# Patient Record
Sex: FEMALE | Race: WHITE | Employment: OTHER | URBAN - METROPOLITAN AREA
[De-identification: names, ages, dates, MRNs, and addresses within clinical notes are randomized per-mention and may not be internally consistent; named-entity substitution may affect disease eponyms.]

---

## 2017-10-26 ENCOUNTER — HOSPITAL ENCOUNTER (OUTPATIENT)
Dept: MAMMOGRAPHY | Age: 72
Discharge: HOME | End: 2017-10-26
Attending: SURGERY | Admitting: SURGERY

## 2017-10-26 DIAGNOSIS — R92.8 ABNORMAL MAMMOGRAM: ICD-10-CM

## 2018-04-04 VITALS
OXYGEN SATURATION: 100 % | WEIGHT: 210 LBS | DIASTOLIC BLOOD PRESSURE: 53 MMHG | BODY MASS INDEX: 34.99 KG/M2 | TEMPERATURE: 97 F | HEIGHT: 65 IN | RESPIRATION RATE: 16 BRPM | SYSTOLIC BLOOD PRESSURE: 136 MMHG | HEART RATE: 76 BPM

## 2018-04-04 RX ORDER — OYSTER SHELL CALCIUM WITH VITAMIN D 500; 200 MG/1; [IU]/1
1 TABLET, FILM COATED ORAL DAILY
COMMUNITY

## 2018-05-02 ENCOUNTER — HOSPITAL ENCOUNTER (OUTPATIENT)
Dept: ENDOSCOPY | Age: 73
Discharge: HOME | End: 2018-04-04

## 2018-10-25 ENCOUNTER — HOSPITAL ENCOUNTER (OUTPATIENT)
Dept: MAMMOGRAPHY | Age: 73
Discharge: HOME | End: 2018-10-25
Payer: MEDICARE

## 2018-10-25 DIAGNOSIS — Z12.31 VISIT FOR SCREENING MAMMOGRAM: ICD-10-CM

## 2018-10-25 PROCEDURE — 77063 BREAST TOMOSYNTHESIS BI: CPT

## 2019-02-25 ENCOUNTER — HOSPITAL ENCOUNTER (OUTPATIENT)
Age: 74
Setting detail: OUTPATIENT SURGERY
Discharge: HOME | End: 2019-02-25
Attending: INTERNAL MEDICINE | Admitting: INTERNAL MEDICINE
Payer: MEDICARE

## 2019-02-25 VITALS
HEART RATE: 59 BPM | WEIGHT: 215 LBS | DIASTOLIC BLOOD PRESSURE: 51 MMHG | TEMPERATURE: 97.2 F | OXYGEN SATURATION: 96 % | HEIGHT: 65 IN | SYSTOLIC BLOOD PRESSURE: 131 MMHG | RESPIRATION RATE: 16 BRPM | BODY MASS INDEX: 35.82 KG/M2

## 2019-02-25 PROCEDURE — 6370000000 HC RX 637 (ALT 250 FOR IP): Performed by: INTERNAL MEDICINE

## 2019-02-25 PROCEDURE — 88305 TISSUE EXAM BY PATHOLOGIST: CPT

## 2019-02-25 PROCEDURE — 7100000010 HC PHASE II RECOVERY - FIRST 15 MIN: Performed by: INTERNAL MEDICINE

## 2019-02-25 PROCEDURE — 7100000011 HC PHASE II RECOVERY - ADDTL 15 MIN: Performed by: INTERNAL MEDICINE

## 2019-02-25 PROCEDURE — 3609012400 HC EGD TRANSORAL BIOPSY SINGLE/MULTIPLE: Performed by: INTERNAL MEDICINE

## 2019-02-25 PROCEDURE — 2709999900 HC NON-CHARGEABLE SUPPLY: Performed by: INTERNAL MEDICINE

## 2019-02-25 PROCEDURE — 6360000002 HC RX W HCPCS: Performed by: INTERNAL MEDICINE

## 2019-02-25 PROCEDURE — 99152 MOD SED SAME PHYS/QHP 5/>YRS: CPT | Performed by: INTERNAL MEDICINE

## 2019-02-25 RX ORDER — SERTRALINE HYDROCHLORIDE 25 MG/1
25 TABLET, FILM COATED ORAL
COMMUNITY
Start: 2019-02-20

## 2019-02-25 RX ORDER — MIRABEGRON 25 MG/1
TABLET, FILM COATED, EXTENDED RELEASE ORAL
COMMUNITY
Start: 2019-02-20

## 2019-02-25 RX ORDER — METOPROLOL SUCCINATE 25 MG/1
12.5 TABLET, EXTENDED RELEASE ORAL DAILY
COMMUNITY

## 2019-02-25 RX ORDER — MIDAZOLAM HYDROCHLORIDE 1 MG/ML
INJECTION INTRAMUSCULAR; INTRAVENOUS PRN
Status: DISCONTINUED | OUTPATIENT
Start: 2019-02-25 | End: 2019-02-25 | Stop reason: ALTCHOICE

## 2019-02-25 RX ORDER — BUMETANIDE 0.5 MG/1
0.5 TABLET ORAL DAILY
COMMUNITY
End: 2022-03-23 | Stop reason: SDUPTHER

## 2019-02-25 RX ORDER — MEPERIDINE HYDROCHLORIDE 50 MG/ML
INJECTION INTRAMUSCULAR; INTRAVENOUS; SUBCUTANEOUS PRN
Status: DISCONTINUED | OUTPATIENT
Start: 2019-02-25 | End: 2019-02-25 | Stop reason: ALTCHOICE

## 2019-02-25 RX ORDER — ISOSORBIDE MONONITRATE 30 MG/1
30 TABLET, EXTENDED RELEASE ORAL DAILY
COMMUNITY

## 2019-02-25 ASSESSMENT — PAIN - FUNCTIONAL ASSESSMENT
PAIN_FUNCTIONAL_ASSESSMENT: 0-10
PAIN_FUNCTIONAL_ASSESSMENT: 0-10
PAIN_FUNCTIONAL_ASSESSMENT: FACES

## 2019-02-25 ASSESSMENT — PAIN SCALES - GENERAL
PAINLEVEL_OUTOF10: 0
PAINLEVEL_OUTOF10: 0

## 2019-07-31 ENCOUNTER — HOSPITAL ENCOUNTER (OUTPATIENT)
Age: 74
Setting detail: SPECIMEN
Discharge: HOME | End: 2019-07-31
Payer: MEDICARE

## 2019-07-31 PROCEDURE — 88341 IMHCHEM/IMCYTCHM EA ADD ANTB: CPT

## 2019-07-31 PROCEDURE — 88342 IMHCHEM/IMCYTCHM 1ST ANTB: CPT

## 2019-10-31 ENCOUNTER — HOSPITAL ENCOUNTER (OUTPATIENT)
Dept: MAMMOGRAPHY | Age: 74
Discharge: HOME | End: 2019-10-31
Payer: MEDICARE

## 2019-10-31 DIAGNOSIS — Z12.31 VISIT FOR SCREENING MAMMOGRAM: ICD-10-CM

## 2019-10-31 PROCEDURE — 77063 BREAST TOMOSYNTHESIS BI: CPT

## 2020-04-01 LAB
CANDIDA SPECIES, DNA PROBE: NORMAL
GARDNERELLA VAGINALIS, DNA PROBE: NORMAL
TRICHOMONAS VAGINALIS DNA: NORMAL

## 2020-04-02 LAB
ORGANISM: ABNORMAL
URINE CULTURE, ROUTINE: ABNORMAL

## 2020-11-12 ENCOUNTER — HOSPITAL ENCOUNTER (OUTPATIENT)
Dept: MAMMOGRAPHY | Age: 75
Discharge: HOME | End: 2020-11-12
Payer: MEDICARE

## 2020-11-12 PROCEDURE — 77063 BREAST TOMOSYNTHESIS BI: CPT

## 2021-09-20 ENCOUNTER — APPOINTMENT (RX ONLY)
Dept: URBAN - METROPOLITAN AREA CLINIC 170 | Facility: CLINIC | Age: 76
Setting detail: DERMATOLOGY
End: 2021-09-20

## 2021-09-20 DIAGNOSIS — L81.4 OTHER MELANIN HYPERPIGMENTATION: ICD-10-CM

## 2021-09-20 DIAGNOSIS — L82.1 OTHER SEBORRHEIC KERATOSIS: ICD-10-CM

## 2021-09-20 DIAGNOSIS — D22 MELANOCYTIC NEVI: ICD-10-CM

## 2021-09-20 DIAGNOSIS — Z85.828 PERSONAL HISTORY OF OTHER MALIGNANT NEOPLASM OF SKIN: ICD-10-CM

## 2021-09-20 DIAGNOSIS — D18.0 HEMANGIOMA: ICD-10-CM

## 2021-09-20 PROBLEM — D18.01 HEMANGIOMA OF SKIN AND SUBCUTANEOUS TISSUE: Status: ACTIVE | Noted: 2021-09-20

## 2021-09-20 PROBLEM — D22.5 MELANOCYTIC NEVI OF TRUNK: Status: ACTIVE | Noted: 2021-09-20

## 2021-09-20 PROCEDURE — ? COUNSELING

## 2021-09-20 PROCEDURE — ? ADDITIONAL NOTES

## 2021-09-20 PROCEDURE — ? TREATMENT REGIMEN

## 2021-09-20 PROCEDURE — ? FULL BODY SKIN EXAM

## 2021-09-20 PROCEDURE — 99203 OFFICE O/P NEW LOW 30 MIN: CPT

## 2021-09-20 ASSESSMENT — LOCATION ZONE DERM: LOCATION ZONE: TRUNK

## 2021-09-20 ASSESSMENT — LOCATION SIMPLE DESCRIPTION DERM
LOCATION SIMPLE: CHEST
LOCATION SIMPLE: LEFT BREAST

## 2021-09-20 ASSESSMENT — LOCATION DETAILED DESCRIPTION DERM
LOCATION DETAILED: LEFT MEDIAL BREAST 10-11:00 REGION
LOCATION DETAILED: MIDDLE STERNUM
LOCATION DETAILED: STERNAL NOTCH
LOCATION DETAILED: UPPER STERNUM

## 2021-09-20 NOTE — PROCEDURE: MIPS QUALITY
Quality 226: Preventive Care And Screening: Tobacco Use: Screening And Cessation Intervention: Patient screened for tobacco use and is an ex/non-smoker
Quality 431: Preventive Care And Screening: Unhealthy Alcohol Use - Screening: Documentation of medical reason(s) for not screening for unhealthy alcohol use (e.g., limited life expectancy, other medical reasons)
Quality 130: Documentation Of Current Medications In The Medical Record: Current Medications with Name, Dosage, Frequency, or Route not Documented, Reason not Given
Detail Level: Detailed

## 2021-09-20 NOTE — HPI: EVALUATION OF SKIN LESION(S)
What Type Of Note Output Would You Prefer (Optional)?: Bullet Format
Hpi Title: Evaluation of Skin Lesions
How Severe Are Your Spot(S)?: mild
Have Your Spot(S) Been Treated In The Past?: has not been treated
Additional History: Patient believes she had a bcc or scc  by another derm

## 2021-12-09 ENCOUNTER — HOSPITAL ENCOUNTER (OUTPATIENT)
Dept: MAMMOGRAPHY | Age: 76
Discharge: HOME | End: 2021-12-09
Payer: MEDICARE

## 2021-12-09 VITALS — HEIGHT: 65 IN | WEIGHT: 210 LBS | BODY MASS INDEX: 34.99 KG/M2

## 2021-12-09 DIAGNOSIS — Z12.31 VISIT FOR SCREENING MAMMOGRAM: ICD-10-CM

## 2021-12-09 PROCEDURE — 77063 BREAST TOMOSYNTHESIS BI: CPT

## 2022-02-21 ENCOUNTER — HOSPITAL ENCOUNTER (EMERGENCY)
Age: 77
Discharge: HOME | End: 2022-02-22
Attending: EMERGENCY MEDICINE
Payer: MEDICARE

## 2022-02-21 ENCOUNTER — APPOINTMENT (OUTPATIENT)
Dept: CT IMAGING | Age: 77
End: 2022-02-21
Payer: MEDICARE

## 2022-02-21 ENCOUNTER — APPOINTMENT (OUTPATIENT)
Dept: GENERAL RADIOLOGY | Age: 77
End: 2022-02-21
Payer: MEDICARE

## 2022-02-21 DIAGNOSIS — R07.9 CHEST PAIN, UNSPECIFIED TYPE: Primary | ICD-10-CM

## 2022-02-21 LAB
ANION GAP SERPL CALCULATED.3IONS-SCNC: 13 MMOL/L (ref 3–16)
BASOPHILS ABSOLUTE: 0 K/UL (ref 0–0.2)
BASOPHILS RELATIVE PERCENT: 0.5 %
BILIRUBIN URINE: NEGATIVE
BLOOD, URINE: NEGATIVE
BUN BLDV-MCNC: 19 MG/DL (ref 7–20)
CALCIUM SERPL-MCNC: 9.1 MG/DL (ref 8.3–10.6)
CHLORIDE BLD-SCNC: 102 MMOL/L (ref 99–110)
CLARITY: CLEAR
CO2: 22 MMOL/L (ref 21–32)
COLOR: YELLOW
CREAT SERPL-MCNC: 1.1 MG/DL (ref 0.6–1.2)
D DIMER: 289 NG/ML DDU (ref 0–229)
EOSINOPHILS ABSOLUTE: 0.2 K/UL (ref 0–0.6)
EOSINOPHILS RELATIVE PERCENT: 2.1 %
EPITHELIAL CELLS, UA: ABNORMAL /HPF (ref 0–5)
GFR AFRICAN AMERICAN: 59
GFR NON-AFRICAN AMERICAN: 48
GLUCOSE BLD-MCNC: 190 MG/DL (ref 70–99)
GLUCOSE URINE: NEGATIVE MG/DL
HCT VFR BLD CALC: 34.6 % (ref 36–48)
HEMOGLOBIN: 12.3 G/DL (ref 12–16)
HYALINE CASTS: ABNORMAL /LPF (ref 0–2)
KETONES, URINE: NEGATIVE MG/DL
LEUKOCYTE ESTERASE, URINE: NEGATIVE
LYMPHOCYTES ABSOLUTE: 2.5 K/UL (ref 1–5.1)
LYMPHOCYTES RELATIVE PERCENT: 30.8 %
MCH RBC QN AUTO: 30.6 PG (ref 26–34)
MCHC RBC AUTO-ENTMCNC: 35.5 G/DL (ref 31–36)
MCV RBC AUTO: 86 FL (ref 80–100)
MICROSCOPIC EXAMINATION: ABNORMAL
MONOCYTES ABSOLUTE: 0.6 K/UL (ref 0–1.3)
MONOCYTES RELATIVE PERCENT: 7.5 %
NEUTROPHILS ABSOLUTE: 4.7 K/UL (ref 1.7–7.7)
NEUTROPHILS RELATIVE PERCENT: 59.1 %
NITRITE, URINE: NEGATIVE
PDW BLD-RTO: 14.5 % (ref 12.4–15.4)
PH UA: 6 (ref 5–8)
PLATELET # BLD: 143 K/UL (ref 135–450)
PMV BLD AUTO: 8.6 FL (ref 5–10.5)
POTASSIUM REFLEX MAGNESIUM: 4 MMOL/L (ref 3.5–5.1)
PRO-BNP: 127 PG/ML (ref 0–449)
PROTEIN UA: NEGATIVE MG/DL
RBC # BLD: 4.02 M/UL (ref 4–5.2)
RBC UA: ABNORMAL /HPF (ref 0–4)
SODIUM BLD-SCNC: 137 MMOL/L (ref 136–145)
SPECIFIC GRAVITY UA: 1.02 (ref 1–1.03)
TROPONIN: <0.01 NG/ML
URINE TYPE: ABNORMAL
UROBILINOGEN, URINE: 0.2 E.U./DL
WBC # BLD: 8 K/UL (ref 4–11)
WBC UA: ABNORMAL /HPF (ref 0–5)

## 2022-02-21 PROCEDURE — 83880 ASSAY OF NATRIURETIC PEPTIDE: CPT

## 2022-02-21 PROCEDURE — 6360000004 HC RX CONTRAST MEDICATION: Performed by: EMERGENCY MEDICINE

## 2022-02-21 PROCEDURE — 84484 ASSAY OF TROPONIN QUANT: CPT

## 2022-02-21 PROCEDURE — 85025 COMPLETE CBC W/AUTO DIFF WBC: CPT

## 2022-02-21 PROCEDURE — 93005 ELECTROCARDIOGRAM TRACING: CPT | Performed by: EMERGENCY MEDICINE

## 2022-02-21 PROCEDURE — 36415 COLL VENOUS BLD VENIPUNCTURE: CPT

## 2022-02-21 PROCEDURE — 80048 BASIC METABOLIC PNL TOTAL CA: CPT

## 2022-02-21 PROCEDURE — 71046 X-RAY EXAM CHEST 2 VIEWS: CPT

## 2022-02-21 PROCEDURE — 71260 CT THORAX DX C+: CPT

## 2022-02-21 PROCEDURE — 81001 URINALYSIS AUTO W/SCOPE: CPT

## 2022-02-21 PROCEDURE — 85379 FIBRIN DEGRADATION QUANT: CPT

## 2022-02-21 PROCEDURE — 99284 EMERGENCY DEPT VISIT MOD MDM: CPT

## 2022-02-21 RX ORDER — SODIUM CHLORIDE 9 MG/ML
25 INJECTION, SOLUTION INTRAVENOUS PRN
Status: DISCONTINUED | OUTPATIENT
Start: 2022-02-21 | End: 2022-02-22 | Stop reason: HOSPADM

## 2022-02-21 RX ORDER — DOBUTAMINE HYDROCHLORIDE 200 MG/100ML
10 INJECTION INTRAVENOUS CONTINUOUS
Status: DISCONTINUED | OUTPATIENT
Start: 2022-02-21 | End: 2022-02-22 | Stop reason: HOSPADM

## 2022-02-21 RX ORDER — SODIUM CHLORIDE 0.9 % (FLUSH) 0.9 %
5-40 SYRINGE (ML) INJECTION EVERY 12 HOURS SCHEDULED
Status: DISCONTINUED | OUTPATIENT
Start: 2022-02-21 | End: 2022-02-22 | Stop reason: HOSPADM

## 2022-02-21 RX ORDER — ASPIRIN 81 MG/1
81 TABLET, CHEWABLE ORAL DAILY
Status: DISCONTINUED | OUTPATIENT
Start: 2022-02-22 | End: 2022-02-22 | Stop reason: HOSPADM

## 2022-02-21 RX ORDER — SODIUM CHLORIDE 0.9 % (FLUSH) 0.9 %
5-40 SYRINGE (ML) INJECTION PRN
Status: DISCONTINUED | OUTPATIENT
Start: 2022-02-21 | End: 2022-02-22 | Stop reason: HOSPADM

## 2022-02-21 RX ADMIN — IOPAMIDOL 80 ML: 755 INJECTION, SOLUTION INTRAVENOUS at 21:44

## 2022-02-21 ASSESSMENT — PAIN DESCRIPTION - FREQUENCY: FREQUENCY: CONTINUOUS

## 2022-02-21 ASSESSMENT — PAIN DESCRIPTION - PAIN TYPE: TYPE: ACUTE PAIN

## 2022-02-21 ASSESSMENT — PAIN DESCRIPTION - ORIENTATION: ORIENTATION: MID

## 2022-02-21 ASSESSMENT — PAIN DESCRIPTION - ONSET: ONSET: ON-GOING

## 2022-02-21 ASSESSMENT — PAIN - FUNCTIONAL ASSESSMENT: PAIN_FUNCTIONAL_ASSESSMENT: 0-10

## 2022-02-21 ASSESSMENT — PAIN SCALES - GENERAL: PAINLEVEL_OUTOF10: 7

## 2022-02-21 ASSESSMENT — PAIN DESCRIPTION - LOCATION: LOCATION: CHEST

## 2022-02-21 ASSESSMENT — PAIN DESCRIPTION - DESCRIPTORS: DESCRIPTORS: ACHING

## 2022-02-22 ENCOUNTER — APPOINTMENT (OUTPATIENT)
Dept: VASCULAR LAB | Age: 77
End: 2022-02-22
Payer: MEDICARE

## 2022-02-22 VITALS
WEIGHT: 220 LBS | HEART RATE: 74 BPM | RESPIRATION RATE: 8 BRPM | TEMPERATURE: 97.7 F | BODY MASS INDEX: 36.65 KG/M2 | OXYGEN SATURATION: 92 % | DIASTOLIC BLOOD PRESSURE: 55 MMHG | HEIGHT: 65 IN | SYSTOLIC BLOOD PRESSURE: 112 MMHG

## 2022-02-22 LAB
EKG ATRIAL RATE: 89 BPM
EKG DIAGNOSIS: NORMAL
EKG P AXIS: 48 DEGREES
EKG P-R INTERVAL: 166 MS
EKG Q-T INTERVAL: 360 MS
EKG QRS DURATION: 96 MS
EKG QTC CALCULATION (BAZETT): 438 MS
EKG R AXIS: 17 DEGREES
EKG T AXIS: 47 DEGREES
EKG VENTRICULAR RATE: 89 BPM
LV EF: 72 %
LVEF MODALITY: NORMAL
TROPONIN: <0.01 NG/ML
TROPONIN: <0.01 NG/ML

## 2022-02-22 PROCEDURE — 6360000002 HC RX W HCPCS: Performed by: EMERGENCY MEDICINE

## 2022-02-22 PROCEDURE — 78452 HT MUSCLE IMAGE SPECT MULT: CPT

## 2022-02-22 PROCEDURE — A9502 TC99M TETROFOSMIN: HCPCS | Performed by: EMERGENCY MEDICINE

## 2022-02-22 PROCEDURE — 2580000003 HC RX 258: Performed by: EMERGENCY MEDICINE

## 2022-02-22 PROCEDURE — 93970 EXTREMITY STUDY: CPT

## 2022-02-22 PROCEDURE — 36415 COLL VENOUS BLD VENIPUNCTURE: CPT

## 2022-02-22 PROCEDURE — 3430000000 HC RX DIAGNOSTIC RADIOPHARMACEUTICAL: Performed by: EMERGENCY MEDICINE

## 2022-02-22 PROCEDURE — 93017 CV STRESS TEST TRACING ONLY: CPT

## 2022-02-22 PROCEDURE — 84484 ASSAY OF TROPONIN QUANT: CPT

## 2022-02-22 RX ORDER — SODIUM CHLORIDE 0.9 % (FLUSH) 0.9 %
10 SYRINGE (ML) INJECTION 2 TIMES DAILY
Status: DISCONTINUED | OUTPATIENT
Start: 2022-02-22 | End: 2022-02-22 | Stop reason: HOSPADM

## 2022-02-22 RX ADMIN — TETROFOSMIN 30 MILLICURIE: 1.38 INJECTION, POWDER, LYOPHILIZED, FOR SOLUTION INTRAVENOUS at 09:34

## 2022-02-22 RX ADMIN — Medication 10 ML: at 08:28

## 2022-02-22 RX ADMIN — Medication 10 ML: at 09:33

## 2022-02-22 RX ADMIN — REGADENOSON 0.4 MG: 0.08 INJECTION, SOLUTION INTRAVENOUS at 09:33

## 2022-02-22 RX ADMIN — TETROFOSMIN 10 MILLICURIE: 1.38 INJECTION, POWDER, LYOPHILIZED, FOR SOLUTION INTRAVENOUS at 08:29

## 2022-02-22 NOTE — ED PROVIDER NOTES
[address] ENCOUNTER          EM ATTENDING NOTE       Date of evaluation: 2/21/2022    Chief Complaint     Chest Pain (Chest pain associated w/ SOB for a few days now), Back Pain, and Shortness of Breath      History of Present Illness     Jatinder Jay is a 78 y.o. female who presents to the emergency department with chest pain, shortness of breath and back pain. Patient has been having intermittent episodes of chest pain with radiation towards her back and associated shortness of breath for last few days. The patient saw her cardiologist today during which she had an EKG which was at her baseline. He recommended that she follow-up for stress testing and nuc med scan. The patient scheduled this for the first available date which is not until the middle of March. She was concerned that this timeframe. She called her  cardiologist who was not able to provide a more rapid appointment. After discussion with her family they elected bring her to our emergency department for evaluation. Here the patient provides further details including that she was seen in urgent care last week for lower extremity swelling left greater than right during which she was told this was likely heart failure. The patient denies any recent immobilization. She denies any history of blood clots. The patient denies any additional complaints other than the lower extremity swelling again left greater than right, chest pain, shortness of breath and back pain. He does admit that the chest and back pain is worse with deep inspiration. She denies any recent fever, abdominal pain, nausea, vomiting or changes in bladder/bowel function. Review of Systems     Positive: Chest pain, shortness of breath, back pain, lower extremity swelling that is asymmetric    Negative: Fever, cough, abdominal pain, nausea, vomiting, changes in taste, changes in smell, changes in bladder or bowel function  See HPI.  A complete review of systems wasconducted and is otherwise negative unless noted above. Past Medical, Surgical, Family, and Social History     She has a past medical history of Diastolic dysfunction, Fibromyalgia, Hypertension, and Osteoarthritis. She has a past surgical history that includes Cholecystectomy; Breast surgery; Hysterectomy; bladder suspension; Colon surgery (1980); joint replacement; Cervical spine surgery (2012); Upper gastrointestinal endoscopy (N/A, 2/25/2019); and Breast cyst excision. Her family history includes BRCA 1 Negative in her daughter; BRCA 2 Negative in her daughter; Breast Cancer in her daughter and mother. She reports that she has never smoked. She has never used smokeless tobacco. She reports that she does not drink alcohol and does not use drugs. Medications     Previous Medications    ACETAMINOPHEN (TYLENOL) 500 MG TABLET    Take 1,000 mg by mouth every 6 hours as needed. ASPIRIN 81 MG TABLET    Take 81 mg by mouth daily    BUMETANIDE (BUMEX) 0.5 MG TABLET    Take 0.5 mg by mouth daily    CALCIUM-VITAMIN D (OSCAL-500) 500-200 MG-UNIT PER TABLET    Take 1 tablet by mouth daily    ESOMEPRAZOLE (NEXIUM) 40 MG CAPSULE    Take 40 mg by mouth every morning (before breakfast). ISOSORBIDE MONONITRATE (IMDUR) 30 MG EXTENDED RELEASE TABLET    Take 30 mg by mouth daily    LISINOPRIL (PRINIVIL;ZESTRIL) 40 MG TABLET    Take 40 mg by mouth daily. METOPROLOL SUCCINATE (TOPROL XL) 25 MG EXTENDED RELEASE TABLET    Take 12.5 mg by mouth daily    MYRBETRIQ 25 MG TB24        SERTRALINE (ZOLOFT) 25 MG TABLET    Take 25 mg by mouth       Allergies     She is allergic to duloxetine, erythromycin, nsaids, pregabalin, and sulfa antibiotics.     Physical Exam     INITIAL VITALS: BP: (!) 145/59, Temp: 97.7 °F (36.5 °C), Pulse: 94, Resp: 20, SpO2: 98 %   Physical Exam    General:  Well developed adult female in no acute distress, able toconverse in full sentences  HEENT:  Normocephalic, atraumatic, PERRL, extra-ocular movements intact, oropharynx benign  Neck:  Supple, no lymphadenopathy, trachea midline, no masses  Pulmonary:   Clear to auscultation bilaterally, good air movement, no wheezes  Cardiac:  Regular rate and rhythm, no M/R/G  Abdomen:  Soft, non-tender, non-distended, no rebounding or guarding  Musculoskeletal:  2+ pulses, mild lower extremity edema bilaterally, left greater than right. Skin:  No concerning rashes or lesions, no cyanosis  Neuro: Alert and oriented X 4,able to move all four extremities with equal strength bilaterally, sensory exam grossly intact, cranial nerves II-XII intact  Psych:  Appropriate mood and affect    Diagnostic Results     EKG   EKG performed in the setting of chest pain shows a normal sinus rhythm with ventricular rate of 89 bpm.  Normal CO interval, QRS duration and QTC. No ST segment changes consistent with STEMI. When compared to most recent EKG in our system from 3/9/2013 I find overall similar morphology. Final interpretation is normal sinus rhythm with no acute signs of ischemia. RADIOLOGY:  CT CHEST PULMONARY EMBOLISM W CONTRAST   Final Result      1. No evidence of pulmonary embolism to the segmental level. XR CHEST (2 VW)   Final Result      No acute cardiopulmonary disease          LABS:   No results found for this visit on 02/21/22. ED BEDSIDE ULTRASOUND:    RECENT VITALS:  BP: (!) 145/59, Temp: 97.7 °F (36.5 °C), Pulse: 94, Resp: 20, SpO2: 98 %     Procedures         ED Course     Nursing Notes, Past Medical Hx, Past Surgical Hx, Social Hx, Allergies, and Family Hx were reviewed. The patient was given the following medications:  No orders of the defined types were placed in this encounter. CONSULTS:  None    MEDICAL DECISION MAKING / ASSESSMENT / Wilmarhugo Lemon is a 78 y.o. female with a history andpresentation as described above in HPI. The patient was evaluated by myself, the ED Attending Physician. On initial encounter the patient was in no acute distress with reassuring vitals and no signs of impending hemodynamic or respiratory collapse. Patient presents to the emergency department with back pain, shortness of breath and chest pain with associated lower extremity swelling for the last week. She was seen by her cardiologist today with plans for outpatient stress testing. On my evaluation I am additionally concerned for possible pulmonary embolism given the lower extremity asymmetric swelling and the pleuritic aspect of her chest and back pain. We will proceed with EKG, chest x-ray and laboratory work-up for further evaluation. EKG shows no acute signs of ischemia. Initial troponin is negative. D-dimer was positive and given her symptomology we will proceed with CTPA. Remaining laboratory work-up is without acute concern. Chest x-ray showed no acute infectious process. At this time I am going off service will be signing out care of the patient to my colleague Dr. Jess Estrada. He will follow up CTPA, reassess the patient to determine eventual disposition. Patient was treated with below medications:  Medications - No data to display      Clinical Impression     Chest pain, lower extremity swelling    Disposition     PATIENT REFERREDTO:  No follow-up provider specified.     DISCHARGE MEDICATIONS:  New Prescriptions    No medications on file       DISPOSITION            Barbara Avila MD  02/21/22 [id number]

## 2022-02-22 NOTE — ED PROVIDER NOTES
[facility] Emergency Department  EDObservation Admission Note   Emergency Physicians       Time Placed in ED Observation: DISPOSITION Ed Observation 02/21/2022 11:21:17 PM    Impression and Plan      In summary, Ryder Ruiz is admitted by to the [facility] for chest pain. Dr. Everton Merino is the CDU admission attending. This patient has been risk-stratified based on the available history, physical exam, and related study findings. Admission toobservation status for further diagnosis/treatment/monitoring of chest pain is warranted clinically. This extendedperiod of observation is specifically required to determine the need for hospitalization. We will observe the patient for the following endpoints:    - Negative serial biomarkers  - Nuclear Stress  -Venous duplex lower extremities    When met, appropriate disposition will be arranged. Diagnostic Evaluation      Diagnostic studies and ED interventions germane to this period of clinical observation willinclude:    - Venous duplex bilateral LE's  - Nuclear cardiac stress       Consultant(s)      None       Patient History      Ryder Ruiz is a 78 y.o. female who presented to the Emergency Department for evaluation of ches tpain. The acute evaluation included negative troponin, unremarkable labs otherwise other than mildly elevated D-dimer, negative CTPA examination. Upon admission to the Clinical Decision Unit, Ryder Ruiz generally well in appearance has had recent dyspnea with some pain back and shoulder. Her D-dimer is mildly elevated. She saw her cardiologist was arranged to have an echo, stress test and reportedly CT scan of the chest for evaluation of shortness of breath, leg swelling, and chest pain, these were not scheduled for several weeks and yet. She is concerned about her symptoms and after discussion with her despite negative ED work-up thus far, we will pursue stress testing from [facility]. We will also obtain venous duplex of bilateral lower extremities given mildly elevated D-dimer, though age-adjusted D-dimer would likely place this at low risk. She is given aspirin here in the ED. She will be monitored overnight with stress testing in the morning. Past Medical History  Past Medical History:   Diagnosis Date    Diastolic dysfunction     Fibromyalgia     Hypertension     Osteoarthritis      Past Surgical History:   Procedure Laterality Date    BLADDER SUSPENSION      BREAST CYST EXCISION      BREAST SURGERY      CERVICAL SPINE SURGERY  2012    fusion    [address]    resection    HYSTERECTOMY      JOINT REPLACEMENT      Knee 2013 right; knee 2015 left knee    UPPER GASTROINTESTINAL ENDOSCOPY N/A 2/25/2019    EGD BIOPSY performed by Shawnee Vides MD at [facility] ENDOSCOPY     Family History   Problem Relation Age of Onset    Breast Cancer Mother     Breast Cancer Daughter     BRCA 2 Negative Daughter     BRCA 1 Negative Daughter      Social History     Tobacco Use    Smoking status: Never Smoker    Smokeless tobacco: Never Used   Vaping Use    Vaping Use: Not on file   Substance Use Topics    Alcohol use: No    Drug use: No        Medications      Previous Medications    ACETAMINOPHEN (TYLENOL) 500 MG TABLET    Take 1,000 mg by mouth every 6 hours as needed. ASPIRIN 81 MG TABLET    Take 81 mg by mouth daily    BUMETANIDE (BUMEX) 0.5 MG TABLET    Take 0.5 mg by mouth daily    CALCIUM-VITAMIN D (OSCAL-500) 500-200 MG-UNIT PER TABLET    Take 1 tablet by mouth daily    ESOMEPRAZOLE (NEXIUM) 40 MG CAPSULE    Take 40 mg by mouth every morning (before breakfast). ISOSORBIDE MONONITRATE (IMDUR) 30 MG EXTENDED RELEASE TABLET    Take 30 mg by mouth daily    LISINOPRIL (PRINIVIL;ZESTRIL) 40 MG TABLET    Take 40 mg by mouth daily.       METOPROLOL SUCCINATE (TOPROL XL) 25 MG EXTENDED RELEASE TABLET    Take 12.5 mg by mouth daily    MYRBETRIQ 25 MG TB24        SERTRALINE (ZOLOFT) 25 MG TABLET    Take 25 mg by mouth          Review of Systems      Review of Systems  Pertinent positives and negatives are listed in the HPI; otherwise all systems are reviewed and were negative. Physical Examination      Physical Exam  Constitutional:       Appearance: She is well-developed. Eyes:      Pupils: Pupils are equal, round, and reactive to light. Neck:      Vascular: No JVD. Cardiovascular:      Rate and Rhythm: Normal rate. Pulmonary:      Effort: Pulmonary effort is normal.      Breath sounds: No decreased breath sounds or wheezing. Musculoskeletal:      Cervical back: Normal range of motion and neck supple. Right lower leg: Edema (1+) present. Left lower leg: Edema (1+) present. Skin:     General: Skin is warm. Neurological:      General: No focal deficit present. Mental Status: She is alert and oriented to person, place, and time.             Erica Pritchard MD  02/22/22 [id number]

## 2022-02-22 NOTE — ED PROVIDER NOTES
[facility] Emergency Department  ED Observation Disposition Note   Emergency Physicians         Diagnostic Evaluation      Diagnostic studies germane to this period of clinical observation include:    - Serial negative troponin  - Negative cardiac stress test  - Negative lower extremity duplex     Consultant(s) Final Recommendations      - n/a     Impression and Plan      Cory Wiggins has been cared for according to the standard [facility] observation protocol for chest pain. This extended period of observation was specifically requiredto determine the need for hospitalization. Prior to discharge from observation, the final physical exam is documented above. Significant events during the course of observation based on the goals of the clinical problem list include:    - Serial negative troponin  - Negative cardiac stress test  - Negative lower extremity duplex    Based on the patient's condition and test results, the plan is to Discharge to home    Thetotal length of observation was 15 hours. Dr. Barbi Turner is the CDU disposition attending. The patient will follow-up with:    - PCP and Cardiologist    As appropriate, please see the AVS for comprehensive discharge instructions. Subjective      Erinn Gan hasundergone comprehensive diagnostic evaluation and therapeutic management in accordance with the CDU guidelines for chest pain. Based on her clinical response and diagnostic information obtained during this period of observation, the disposition is Discharge to home     Physical Examination      Physical Exam  Vitals and nursing note reviewed. Cardiovascular:      Rate and Rhythm: Normal rate and regular rhythm. Pulmonary:      Effort: Pulmonary effort is normal.   Chest:      Chest wall: No tenderness. Abdominal:      Tenderness: There is no abdominal tenderness. Skin:     General: Skin is warm and dry.    Neurological:      General: No focal deficit present. Mental Status: She is alert.              Donn Alpers, MD  02/22/22 9110

## 2022-03-02 ENCOUNTER — OFFICE VISIT (OUTPATIENT)
Dept: CARDIOLOGY CLINIC | Age: 77
End: 2022-03-02
Payer: MEDICARE

## 2022-03-02 VITALS
BODY MASS INDEX: 36.51 KG/M2 | WEIGHT: 219.4 LBS | DIASTOLIC BLOOD PRESSURE: 66 MMHG | HEART RATE: 81 BPM | SYSTOLIC BLOOD PRESSURE: 138 MMHG

## 2022-03-02 DIAGNOSIS — I20.0 UNSTABLE ANGINA PECTORIS (HCC): Primary | ICD-10-CM

## 2022-03-02 DIAGNOSIS — I10 HTN (HYPERTENSION), BENIGN: ICD-10-CM

## 2022-03-02 DIAGNOSIS — R07.9 CHEST PAIN, UNSPECIFIED TYPE: Primary | ICD-10-CM

## 2022-03-02 DIAGNOSIS — E78.5 HYPERLIPIDEMIA, UNSPECIFIED HYPERLIPIDEMIA TYPE: ICD-10-CM

## 2022-03-02 PROCEDURE — 93000 ELECTROCARDIOGRAM COMPLETE: CPT | Performed by: INTERNAL MEDICINE

## 2022-03-02 PROCEDURE — 99214 OFFICE O/P EST MOD 30 MIN: CPT | Performed by: INTERNAL MEDICINE

## 2022-03-02 RX ORDER — SPIRONOLACTONE 25 MG/1
TABLET ORAL
COMMUNITY
Start: 2022-01-28 | End: 2022-06-23 | Stop reason: SDUPTHER

## 2022-03-02 NOTE — PROGRESS NOTES
[address]         Reason for Consultation/Chief Complaint: \"I have been having swelling and HTN. \"       History of Present Illness:  Adan Arana is a 78 y.o. patient who presented to the hospital with complaints of sob and chest pains. MPI was negative for ischemia. Echo is pending at 3/17/22. Has discomfort with heavy exertion. Past Medical History:   has a past medical history of Diastolic dysfunction, Fibromyalgia, Hypertension, and Osteoarthritis. Surgical History:   has a past surgical history that includes Cholecystectomy; Breast surgery; Hysterectomy; bladder suspension; Colon surgery (1980); joint replacement; Cervical spine surgery (2012); Upper gastrointestinal endoscopy (N/A, 2/25/2019); and Breast cyst excision. Social History:   reports that she has never smoked. She has never used smokeless tobacco. She reports that she does not drink alcohol and does not use drugs. Family History:  No evidence for sudden cardiac death or premature CAD    Home Medications:  Were reviewed and are listed in nursing record. and/or listed below  Prior to Admission medications    Medication Sig Start Date End Date Taking?  Authorizing Provider   bumetanide (BUMEX) 0.5 MG tablet Take 0.5 mg by mouth daily   Yes Historical Provider, MD   isosorbide mononitrate (IMDUR) 30 MG extended release tablet Take 30 mg by mouth daily   Yes Historical Provider, MD   aspirin 81 MG tablet Take 81 mg by mouth daily   Yes Historical Provider, MD   metoprolol succinate (TOPROL XL) 25 MG extended release tablet Take 12.5 mg by mouth daily   Yes Historical Provider, MD   MYRBETRIQ 25 MG TB24  2/20/19  Yes Historical Provider, MD   sertraline (ZOLOFT) 25 MG tablet Take 25 mg by mouth 2/20/19  Yes Historical Provider, MD   calcium-vitamin D (OSCAL-500) 500-200 MG-UNIT per tablet Take 1 tablet by mouth daily   Yes Historical Provider, MD   acetaminophen (TYLENOL) 500 MG tablet Take 1,000 mg by mouth every 6 hours as needed. Yes Historical Provider, MD   lisinopril (PRINIVIL;ZESTRIL) 40 MG tablet Take 40 mg by mouth daily. Yes Historical Provider, MD   esomeprazole (NEXIUM) 40 MG capsule Take 40 mg by mouth every morning (before breakfast). Yes Historical Provider, MD    takes nitropatch instead of imdur and doesn't take metoprolol. Hospital Medications: Allergies:  Duloxetine, Erythromycin, Nsaids, Pregabalin, and Sulfa antibiotics     Review of Systems:     A 14 ROS obtained and negative except as mentioned in HPI. · Constitutional: there has been no unanticipated weight loss. · Eyes: No visual changes or diplopia. No scleral icterus. · ENT: No Headaches, hearing loss or vertigo. No mouth sores or sore throat. · Cardiovascular: No loss of consciousness. No hemoptysis, pleuritic pain, or phlebitis. · Respiratory: No cough or wheezing, no sputum production. No hematemesis. · Gastrointestinal: No abdominal pain, appetite loss, blood in stools. No change in bowel or bladder habits. · Genitourinary: No dysuria, or hematuria. · Musculoskeletal:  No gait disturbance, weakness or joint complaints. · Integumentary: No rash or pruritis. · Neurological: No headache, diplopia,numbness or tingling. No change in gait, balance, coordination, mood, affect, memory, mentation, behavior.   · Psychiatric: No anxiety,  · Endocrine: No malaise,  · Hematologic/Lymphatic: No abnormal bruising  · Allergic/Immunologic: No nasal congestion      Physical Examination:    Vitals:    03/02/22 1336   BP: 138/66   Pulse: 81    Weight: 219 lb 6.4 oz (99.5 kg)         General Appearance:  Alert, cooperative, no distress, appears stated age   Head:  Normocephalic, without obvious abnormality, atraumatic   Eyes:  PERRL   Nose: Nares normal,   Neck: Supple, JVP normal    Lungs:   Clear to auscultation bilaterally, respirations unlabored   Chest Wall:  No tenderness or deformity   Heart:  regular rate and rhythm, normal S1, S2 normal, no murmur, No rub. No S3 / S4 gallop   Abdomen:   Soft, non-tender, +bowel sounds   Extremities: no cyanosis, no clubbing , No edema   Pulses: Symmetric extremities   Skin: no gross lesions or rashes   Pysch: Normal mood and affect   Neurologic: No gross deficits. CN II - XII grossly intact        Labs  CBC:   Lab Results   Component Value Date    WBC 8.0 02/21/2022    RBC 4.02 02/21/2022    HGB 12.3 02/21/2022    HCT 34.6 02/21/2022    MCV 86.0 02/21/2022    RDW 14.5 02/21/2022     02/21/2022     CMP:    Lab Results   Component Value Date     02/21/2022    K 4.0 02/21/2022     02/21/2022    CO2 22 02/21/2022    BUN 19 02/21/2022    CREATININE 1.1 02/21/2022    GFRAA 59 02/21/2022    LABGLOM 48 02/21/2022    GLUCOSE 190 02/21/2022    CALCIUM 9.1 02/21/2022     PT/INR:  No results found for: PTINR  No results for input(s): CKTOTAL, CKMB, TROPONINI in the last 72 hours. EKG:  SR with minimal nonspecific changes. Assessment     Diagnosis Orders   1. Chest pain, unspecified type  EKG 12 Lead   2. HTN (hypertension), benign     3. Hyperlipidemia, unspecified hyperlipidemia type           Impression:  Back pain. Sob. Recommendations:    I had the opportunity to review the clinical symptoms and presentation of Shakir Gonzales [age]. MPI was normal on 2/22/22. Check echo. Concerned for balanced 3 vessel CAD. Pt has concerning symptoms for CAD with upper back pain and severe SOB with exertion. Can get wrist angio with normal michelle's right wrist in office. Thank you for allowing to us to participate in the care or Shakir Gonzales 1762. All questions and concerns were addressed to the patient/family. Alternatives to my treatment were discussed. The note was completed using EMR. Every effort was made to ensure accuracy; however, inadvertent computerized transcription errors may be present.        Ethel Molina MD [facility] [place]

## 2022-03-03 ENCOUNTER — PREP FOR PROCEDURE (OUTPATIENT)
Dept: CARDIOLOGY CLINIC | Age: 77
End: 2022-03-03

## 2022-03-03 DIAGNOSIS — I20.0 UNSTABLE ANGINA PECTORIS (HCC): Primary | ICD-10-CM

## 2022-03-03 RX ORDER — SODIUM CHLORIDE 9 MG/ML
25 INJECTION, SOLUTION INTRAVENOUS PRN
Status: CANCELLED | OUTPATIENT
Start: 2022-03-03

## 2022-03-03 RX ORDER — SODIUM CHLORIDE 0.9 % (FLUSH) 0.9 %
5-40 SYRINGE (ML) INJECTION PRN
Status: CANCELLED | OUTPATIENT
Start: 2022-03-03

## 2022-03-03 RX ORDER — SODIUM CHLORIDE 0.9 % (FLUSH) 0.9 %
5-40 SYRINGE (ML) INJECTION EVERY 12 HOURS SCHEDULED
Status: CANCELLED | OUTPATIENT
Start: 2022-03-03

## 2022-03-03 RX ORDER — SODIUM CHLORIDE 9 MG/ML
INJECTION, SOLUTION INTRAVENOUS CONTINUOUS
Status: CANCELLED | OUTPATIENT
Start: 2022-03-03

## 2022-03-03 RX ORDER — ASPIRIN 325 MG
325 TABLET ORAL ONCE
Status: CANCELLED | OUTPATIENT
Start: 2022-03-15

## 2022-03-03 NOTE — PROGRESS NOTES
Left Heart Catheterization    A left heart catheterization is a procedure that provides your cardiologist with detailed information regarding how your heart functions. A small catheter (long, fine tube) is inserted into an artery (a vessel that carries blood and oxygen) that leads to your heart. While watching with x-ray equipment, small amounts of dye are injected which enables visualization of the heart arteries and chambers. The pictures that your cardiologist receives from the cardiac catheterization enable him or her to decide on the best treatment for you. Date of the procedure:  3/15/22    Time of arrival: 0830    Cardiologist performing the procedure: ____________Dr Mar______________________    Instructions for your left heart catheterization:    1. Bring a list of your medications to the hospital.    2.  Please notify us before the procedure if you are allergic to anything; especially x-ray contrast dye, iodine, nickel, or any type of jewelry. This is very important! 3. Do not eat or drink anything at all after midnight (or 8 hours) prior to the procedure. 4.  Take all morning medications EXCEPT any diuretics (water pills) the day of the procedure with a small sip of water. 5.  If you are on Coumadin, Warfarin, or Valiant Keita, please notify us so that we can make adjustments to your medication. 6.  If you are taking Xarelto, Eliquis, or Pradaxa, please stop staking these medications two days prior to the procedure (including the day of the procedure). 7.  If you are diabetic, check your blood sugar in the morning. If your blood sugar is 120 or less, do not take insulin. If your blood sugar is more than 120, take half the dose of your normal insulin. Do not take Metformin the night before your procedure or morning of the procedure. 8.  You MUST have someone to drive you home--no driving for 24 hours after your procedure.   If an intervention is performed, you might stay overnight in the hospital.    9.  Discharge instructions will be given to you at the time of your procedure. 10.  For any questions or if you cannot keep this appointment for any reason, please call [phone]. Spoke with pt regarding procedure. Pre-op instructions, time and location of arrival discussed. . Pt verbalized understanding and all questions answered at this time.

## 2022-03-10 ENCOUNTER — PROCEDURE VISIT (OUTPATIENT)
Dept: CARDIOLOGY CLINIC | Age: 77
End: 2022-03-10
Payer: MEDICARE

## 2022-03-10 DIAGNOSIS — I20.0 UNSTABLE ANGINA PECTORIS (HCC): ICD-10-CM

## 2022-03-10 LAB
LV EF: 53 %
LVEF MODALITY: NORMAL

## 2022-03-11 PROCEDURE — 93306 TTE W/DOPPLER COMPLETE: CPT | Performed by: INTERNAL MEDICINE

## 2022-03-14 NOTE — PRE-PROCEDURE INSTRUCTIONS
Called patient about procedure. Told to be here at 0830 for procedure at 1000. Must be NPO after midnight but can take morning medication with sips of water. Patient stated she is not on a blood thinner. Told to have a responsible adult with them to take them home and stay with them afterwards, if they do not get admitted to hospital. Also, to bring a current list of medications. No other questions or concerns.

## 2022-03-15 ENCOUNTER — HOSPITAL ENCOUNTER (OUTPATIENT)
Dept: CARDIAC CATH/INVASIVE PROCEDURES | Age: 77
Discharge: HOME | End: 2022-03-17
Payer: MEDICARE

## 2022-03-15 VITALS — TEMPERATURE: 97.9 F | HEIGHT: 65 IN | WEIGHT: 216 LBS | BODY MASS INDEX: 35.99 KG/M2

## 2022-03-15 LAB
A/G RATIO: 1.6 (ref 1.1–2.2)
ALBUMIN SERPL-MCNC: 4 G/DL (ref 3.4–5)
ALP BLD-CCNC: 55 U/L (ref 40–129)
ALT SERPL-CCNC: 32 U/L (ref 10–40)
ANION GAP SERPL CALCULATED.3IONS-SCNC: 9 MMOL/L (ref 3–16)
AST SERPL-CCNC: 19 U/L (ref 15–37)
BILIRUB SERPL-MCNC: <0.2 MG/DL (ref 0–1)
BUN BLDV-MCNC: 17 MG/DL (ref 7–20)
CALCIUM SERPL-MCNC: 9.2 MG/DL (ref 8.3–10.6)
CHLORIDE BLD-SCNC: 107 MMOL/L (ref 99–110)
CO2: 25 MMOL/L (ref 21–32)
CREAT SERPL-MCNC: 0.9 MG/DL (ref 0.6–1.2)
EKG ATRIAL RATE: 68 BPM
EKG DIAGNOSIS: NORMAL
EKG P AXIS: 17 DEGREES
EKG P-R INTERVAL: 184 MS
EKG Q-T INTERVAL: 398 MS
EKG QRS DURATION: 94 MS
EKG QTC CALCULATION (BAZETT): 423 MS
EKG R AXIS: 91 DEGREES
EKG T AXIS: 73 DEGREES
EKG VENTRICULAR RATE: 68 BPM
GFR AFRICAN AMERICAN: >60
GFR NON-AFRICAN AMERICAN: >60
GLUCOSE BLD-MCNC: 131 MG/DL (ref 70–99)
HCT VFR BLD CALC: 31.7 % (ref 36–48)
HEMOGLOBIN: 10.6 G/DL (ref 12–16)
INR BLD: 1.01 (ref 0.88–1.12)
LEFT VENTRICULAR EJECTION FRACTION MODE: NORMAL
LV EF: 50 %
MCH RBC QN AUTO: 28.9 PG (ref 26–34)
MCHC RBC AUTO-ENTMCNC: 33.3 G/DL (ref 31–36)
MCV RBC AUTO: 86.9 FL (ref 80–100)
PDW BLD-RTO: 15.2 % (ref 12.4–15.4)
PLATELET # BLD: 127 K/UL (ref 135–450)
PMV BLD AUTO: 8.2 FL (ref 5–10.5)
POTASSIUM SERPL-SCNC: 4.7 MMOL/L (ref 3.5–5.1)
PROTHROMBIN TIME: 11.4 SEC (ref 9.9–12.7)
RBC # BLD: 3.65 M/UL (ref 4–5.2)
SODIUM BLD-SCNC: 141 MMOL/L (ref 136–145)
TOTAL PROTEIN: 6.5 G/DL (ref 6.4–8.2)
WBC # BLD: 4.7 K/UL (ref 4–11)

## 2022-03-15 PROCEDURE — 99152 MOD SED SAME PHYS/QHP 5/>YRS: CPT

## 2022-03-15 PROCEDURE — 93010 ELECTROCARDIOGRAM REPORT: CPT | Performed by: INTERNAL MEDICINE

## 2022-03-15 PROCEDURE — 85027 COMPLETE CBC AUTOMATED: CPT

## 2022-03-15 PROCEDURE — C1894 INTRO/SHEATH, NON-LASER: HCPCS

## 2022-03-15 PROCEDURE — 93458 L HRT ARTERY/VENTRICLE ANGIO: CPT

## 2022-03-15 PROCEDURE — C1769 GUIDE WIRE: HCPCS

## 2022-03-15 PROCEDURE — 93458 L HRT ARTERY/VENTRICLE ANGIO: CPT | Performed by: INTERNAL MEDICINE

## 2022-03-15 PROCEDURE — 6360000002 HC RX W HCPCS

## 2022-03-15 PROCEDURE — 85610 PROTHROMBIN TIME: CPT

## 2022-03-15 PROCEDURE — 2709999900 HC NON-CHARGEABLE SUPPLY

## 2022-03-15 PROCEDURE — 6360000004 HC RX CONTRAST MEDICATION: Performed by: INTERNAL MEDICINE

## 2022-03-15 PROCEDURE — 2500000003 HC RX 250 WO HCPCS

## 2022-03-15 PROCEDURE — 80053 COMPREHEN METABOLIC PANEL: CPT

## 2022-03-15 PROCEDURE — 93005 ELECTROCARDIOGRAM TRACING: CPT | Performed by: INTERNAL MEDICINE

## 2022-03-15 RX ORDER — SODIUM CHLORIDE 0.9 % (FLUSH) 0.9 %
5-40 SYRINGE (ML) INJECTION PRN
Status: DISCONTINUED | OUTPATIENT
Start: 2022-03-15 | End: 2022-03-18 | Stop reason: HOSPADM

## 2022-03-15 RX ORDER — SODIUM CHLORIDE 0.9 % (FLUSH) 0.9 %
5-40 SYRINGE (ML) INJECTION EVERY 12 HOURS SCHEDULED
Status: DISCONTINUED | OUTPATIENT
Start: 2022-03-15 | End: 2022-03-18 | Stop reason: HOSPADM

## 2022-03-15 RX ORDER — ONDANSETRON 2 MG/ML
4 INJECTION INTRAMUSCULAR; INTRAVENOUS EVERY 6 HOURS PRN
Status: DISCONTINUED | OUTPATIENT
Start: 2022-03-15 | End: 2022-03-18 | Stop reason: HOSPADM

## 2022-03-15 RX ORDER — SODIUM CHLORIDE 9 MG/ML
INJECTION, SOLUTION INTRAVENOUS CONTINUOUS
Status: ACTIVE | OUTPATIENT
Start: 2022-03-15 | End: 2022-03-15

## 2022-03-15 RX ORDER — SODIUM CHLORIDE 9 MG/ML
INJECTION, SOLUTION INTRAVENOUS CONTINUOUS
Status: DISCONTINUED | OUTPATIENT
Start: 2022-03-15 | End: 2022-03-18 | Stop reason: HOSPADM

## 2022-03-15 RX ORDER — SODIUM CHLORIDE 9 MG/ML
25 INJECTION, SOLUTION INTRAVENOUS PRN
Status: DISCONTINUED | OUTPATIENT
Start: 2022-03-15 | End: 2022-03-18 | Stop reason: HOSPADM

## 2022-03-15 RX ORDER — ACETAMINOPHEN 325 MG/1
650 TABLET ORAL EVERY 4 HOURS PRN
Status: DISCONTINUED | OUTPATIENT
Start: 2022-03-15 | End: 2022-03-18 | Stop reason: HOSPADM

## 2022-03-15 RX ORDER — ASPIRIN 325 MG
325 TABLET ORAL ONCE
Status: DISCONTINUED | OUTPATIENT
Start: 2022-03-15 | End: 2022-03-18 | Stop reason: HOSPADM

## 2022-03-15 RX ORDER — AMITRIPTYLINE HYDROCHLORIDE 10 MG/1
10 TABLET, FILM COATED ORAL NIGHTLY
COMMUNITY

## 2022-03-15 RX ORDER — PRAVASTATIN SODIUM 20 MG
20 TABLET ORAL DAILY
COMMUNITY

## 2022-03-15 RX ORDER — ATROPINE SULFATE 0.4 MG/ML
0.5 AMPUL (ML) INJECTION
Status: ACTIVE | OUTPATIENT
Start: 2022-03-15 | End: 2022-03-15

## 2022-03-15 RX ADMIN — IOHEXOL 150 ML: 350 INJECTION, SOLUTION INTRAVENOUS at 11:12

## 2022-03-15 NOTE — H&P
[address]          Reason for Consultation/Chief Complaint: \"I have been having swelling and HTN. \"       History of Present Illness:  Adan Arana is a 78 y.o. patient who presented to the hospital with complaints of sob and chest pains. MPI was negative for ischemia. Echo is pending at 3/17/22. Has discomfort with heavy exertion.       Past Medical History:   has a past medical history of Diastolic dysfunction, Fibromyalgia, Hypertension, and Osteoarthritis.     Surgical History:   has a past surgical history that includes Cholecystectomy; Breast surgery; Hysterectomy; bladder suspension; Colon surgery (1980); joint replacement; Cervical spine surgery (2012); Upper gastrointestinal endoscopy (N/A, 2/25/2019); and Breast cyst excision.      Social History:   reports that she has never smoked. She has never used smokeless tobacco. She reports that she does not drink alcohol and does not use drugs.      Family History:  No evidence for sudden cardiac death or premature CAD     Home Medications:  Were reviewed and are listed in nursing record. and/or listed below  Home Medications           Prior to Admission medications    Medication Sig Start Date End Date Taking?  Authorizing Provider   bumetanide (BUMEX) 0.5 MG tablet Take 0.5 mg by mouth daily     Yes Historical Provider, MD   isosorbide mononitrate (IMDUR) 30 MG extended release tablet Take 30 mg by mouth daily     Yes Historical Provider, MD   aspirin 81 MG tablet Take 81 mg by mouth daily     Yes Historical Provider, MD   metoprolol succinate (TOPROL XL) 25 MG extended release tablet Take 12.5 mg by mouth daily     Yes Historical Provider, MD   MYRBETRIQ 25 MG TB24   2/20/19   Yes Historical Provider, MD   sertraline (ZOLOFT) 25 MG tablet Take 25 mg by mouth 2/20/19   Yes Historical Provider, MD   calcium-vitamin D (OSCAL-500) 500-200 MG-UNIT per tablet Take 1 tablet by mouth daily     Yes Historical Provider, MD acetaminophen (TYLENOL) 500 MG tablet Take 1,000 mg by mouth every 6 hours as needed.     Yes Historical Provider, MD   lisinopril (PRINIVIL;ZESTRIL) 40 MG tablet Take 40 mg by mouth daily.       Yes Historical Provider, MD   esomeprazole (NEXIUM) 40 MG capsule Take 40 mg by mouth every morning (before breakfast).       Yes Historical Provider, MD       takes nitropatch instead of imdur and doesn't take metoprolol.       Hospital Medications:              Allergies:  Duloxetine, Erythromycin, Nsaids, Pregabalin, and Sulfa antibiotics      Review of Systems:      A 14 ROS obtained and negative except as mentioned in HPI.     · Constitutional: there has been no unanticipated weight loss. · Eyes: No visual changes or diplopia. No scleral icterus. · ENT: No Headaches, hearing loss or vertigo. No mouth sores or sore throat. · Cardiovascular: No loss of consciousness. No hemoptysis, pleuritic pain, or phlebitis. · Respiratory: No cough or wheezing, no sputum production. No hematemesis. · Gastrointestinal: No abdominal pain, appetite loss, blood in stools. No change in bowel or bladder habits. · Genitourinary: No dysuria, or hematuria. · Musculoskeletal:  No gait disturbance, weakness or joint complaints. · Integumentary: No rash or pruritis. · Neurological: No headache, diplopia,numbness or tingling. No change in gait, balance, coordination, mood, affect, memory, mentation, behavior.   · Psychiatric: No anxiety,  · Endocrine: No malaise,  · Hematologic/Lymphatic: No abnormal bruising  · Allergic/Immunologic: No nasal congestion        Physical Examination:        Vitals:     03/02/22 1336   BP: 138/66   Pulse: 81    Weight: 219 lb 6.4 oz (99.5 kg)           General Appearance:  Alert, cooperative, no distress, appears stated age   Head:  Normocephalic, without obvious abnormality, atraumatic   Eyes:  PERRL   Nose: Nares normal,   Neck: Supple, JVP normal    Lungs:   Clear to auscultation bilaterally, respirations unlabored   Chest Wall:  No tenderness or deformity   Heart:  regular rate and rhythm, normal S1, S2 normal, no murmur, No rub. No S3 / S4 gallop   Abdomen:   Soft, non-tender, +bowel sounds   Extremities: no cyanosis, no clubbing , No edema   Pulses: Symmetric extremities   Skin: no gross lesions or rashes   Pysch: Normal mood and affect   Neurologic: No gross deficits. CN II - XII grossly intact         Labs  CBC:         Lab Results   Component Value Date     WBC 8.0 02/21/2022     RBC 4.02 02/21/2022     HGB 12.3 02/21/2022     HCT 34.6 02/21/2022     MCV 86.0 02/21/2022     RDW 14.5 02/21/2022      02/21/2022      CMP:          Lab Results   Component Value Date      02/21/2022     K 4.0 02/21/2022      02/21/2022     CO2 22 02/21/2022     BUN 19 02/21/2022     CREATININE 1.1 02/21/2022     GFRAA 59 02/21/2022     LABGLOM 48 02/21/2022     GLUCOSE 190 02/21/2022     CALCIUM 9.1 02/21/2022      PT/INR:  No results found for: PTINR  No results for input(s): CKTOTAL, CKMB, TROPONINI in the last 72 hours.     EKG:  SR with minimal nonspecific changes.       Assessment       Diagnosis Orders   1. Chest pain, unspecified type  EKG 12 Lead   2. HTN (hypertension), benign      3. Hyperlipidemia, unspecified hyperlipidemia type               Impression:  Back pain. Sob.       Recommendations:     I had the opportunity to review the clinical symptoms and presentation of Suman Gan.      MPI was normal on 2/22/22.       Check echo. Concerned for balanced 3 vessel CAD. Pt has concerning symptoms for CAD with upper back pain and severe SOB with exertion. Can get wrist angio with normal michelle's right wrist in office.     Thank you for allowing to us to participate in the care or Adan Arana.      All questions and concerns were addressed to the patient/family. Alternatives to my treatment were discussed. The note was completed using EMR.  Every effort was made to ensure accuracy; however, inadvertent computerized transcription errors may be present.     Addendum:  Pt wishes to proceed. Consent signed.

## 2022-03-15 NOTE — FLOWSHEET NOTE
03/15/22 1030   Encounter Summary   Services provided to: Patient and family together   Referral/Consult From: Rounding   Continue Visiting   (3/15, natalya )   Complexity of Encounter Moderate   Length of Encounter 15 minutes   Routine   Type Pre-procedure   Assessment Calm; Approachable; Hopeful   Intervention Active listening;Explored feelings, thoughts, concerns   Outcome Comfort;Expressed gratitude   [facility] [place]

## 2022-03-15 NOTE — PLAN OF CARE
Brief Pre-Op Note/Sedation Assessment      Cory Wiggins  1945  [id number]  10:54 AM    Planned Procedure: Cardiac Catheterization Procedure  Post Procedure Plan: Return to same level of care  Consent: I have discussed with the patient and/or the patient representative the indication, alternatives, and the possible risks and/or complications of the planned procedure and the anesthesia methods. The patient and/or patient representative appear to understand and agree to proceed. Chief Complaint:   Chest Pain/Pressure  Dyspnea  Fatigue      Indications for Cath Procedure:  1. Presentation:  Worsening Angina  2. Anginal Classification within 2 weeks:  CCS III - Symptoms with everyday living activities, i.e., moderate limitation  3. Angina Symptoms Assessment:  Typical Chest Pain  4. Heart Failure Class within last 2 weeks:  No symptoms  5. Cardiovascular Instability:  No    Prior Ischemic Workup/Eval:  1. Pre-Procedural Medications: Yes: Aspirin  2. Stress Test Completed? No    Does Patient need surgery? Cath Valve Surgery:  No    Pre-Procedure Medical History:  Vital Signs:  Temp 97.9 °F (36.6 °C) (Oral)   Ht 5' 5\" (1.651 m)   Wt 216 lb (98 kg)   BMI 35.94 kg/m²     Allergies: Allergies   Allergen Reactions    Alendronate      Other reaction(s): GI Upset  Pt had severe gi distress.  Atorvastatin Other (See Comments)     Other reaction(s): Muscle Aches    Cefdinir      Other reaction(s): Other - comment required  Pt. States feels like throat closes up - like laryngitis    Duloxetine Nausea Only    Duloxetine Hcl Nausea Only    Erythromycin Swelling     throat    Furosemide Itching    Nsaids Other (See Comments)     \" I should take due to diastolic dysfunction.  \"    Paroxetine Hcl      Other reaction(s): GI Upset  Stomach pains    Pregabalin      Other reaction(s): GI Upset    Sulfa Antibiotics Rash     Medications:    Current Outpatient Medications   Medication Sig Dispense Refill    amitriptyline (ELAVIL) 10 MG tablet Take 10 mg by mouth nightly      pravastatin (PRAVACHOL) 20 MG tablet Take 20 mg by mouth daily      bumetanide (BUMEX) 0.5 MG tablet Take 0.5 mg by mouth daily      calcium-vitamin D (OSCAL-500) 500-200 MG-UNIT per tablet Take 1 tablet by mouth daily      lisinopril (PRINIVIL;ZESTRIL) 40 MG tablet Take 40 mg by mouth daily.  esomeprazole (NEXIUM) 40 MG capsule Take 40 mg by mouth every morning (before breakfast).  spironolactone (ALDACTONE) 25 MG tablet TAKE ONE Tablet BY MOUTH EVERY DAY (Patient not taking: Reported on 3/15/2022)      isosorbide mononitrate (IMDUR) 30 MG extended release tablet Take 30 mg by mouth daily (Patient not taking: Reported on 3/15/2022)      aspirin 81 MG tablet Take 81 mg by mouth daily      metoprolol succinate (TOPROL XL) 25 MG extended release tablet Take 12.5 mg by mouth daily (Patient not taking: Reported on 3/15/2022)      MYRBETRIQ 25 MG TB24  (Patient not taking: Reported on 3/15/2022)      sertraline (ZOLOFT) 25 MG tablet Take 25 mg by mouth (Patient not taking: Reported on 3/15/2022)      acetaminophen (TYLENOL) 500 MG tablet Take 1,000 mg by mouth every 6 hours as needed.        Current Facility-Administered Medications   Medication Dose Route Frequency Provider Last Rate Last Admin    0.9 % sodium chloride infusion   IntraVENous Continuous Debbie Montano MD        0.9 % sodium chloride infusion  25 mL IntraVENous PRN Debbie Montano MD        aspirin tablet 325 mg  325 mg Oral Once Debbie Montano MD        sodium chloride flush 0.9 % injection 5-40 mL  5-40 mL IntraVENous 2 times per day Debbie Montano MD        sodium chloride flush 0.9 % injection 5-40 mL  5-40 mL IntraVENous PRN Debbie Montano MD           Past Medical History:    Past Medical History:   Diagnosis Date    Diastolic dysfunction     Fibromyalgia     Hypertension     Osteoarthritis        Surgical History:    Past Surgical History:   Procedure Laterality Date    BLADDER SUSPENSION      BREAST CYST EXCISION      BREAST SURGERY      CERVICAL SPINE SURGERY  2012    fusion     CHOLECYSTECTOMY      COLON SURGERY  1980    resection    HYSTERECTOMY      JOINT REPLACEMENT      Knee 2013 right; knee 2015 left knee    UPPER GASTROINTESTINAL ENDOSCOPY N/A 2/25/2019    EGD BIOPSY performed by Juan Pablo Barrow MD at [facility] ENDOSCOPY             Pre-Sedation:  Pre-Sedation Documentation and Exam:  I have personally completed a history, physical exam & review of systems for this patient (see notes). Prior History of Anesthesia Complications:   none    Modified Mallampati:  II (soft palate, uvula, fauces visible)    ASA Classification:  Class 1 - A normal healthy patient and Class 2 - A normal healthy patient with mild systemic disease    Elizabeth Scale: Activity:  2 - Able to move 4 extremities voluntarily on command  Respiration:  2 - Able to breathe deeply and cough freely  Circulation:  1 - BP+/- 20-50mmHg of normal  Consciousness:  2 - Fully awake  Oxygen Saturation (color):  2 - Able to maintain oxygen saturation >92% on room air    Sedation/Anesthesia Plan:  Guard the patient's safety and welfare. Minimize physical discomfort and pain. Minimize negative psychological responses to treatment by providing sedation and analgesia and maximize the potential amnesia. Patient to meet pre-procedure discharge plan.     Medication Planned:  midazolam intravenously and fentanyl intravenously    Patient is an appropriate candidate for plan of sedation:   yes      Electronically signed by Azael Sánchez MD on 3/15/2022 at 10:54 AM

## 2022-03-16 NOTE — PROCEDURES
[facility] [address], [address]                            CARDIAC CATHETERIZATION    PATIENT NAME: Soraida Garcia                      :        1945  MED REC NO:   [id number]                          ROOM:  ACCOUNT NO:   [de-identified]                           ADMIT DATE: 03/15/2022  PROVIDER:     Janay Herrera. Rachel Bolton MD    DATE OF PROCEDURE:  03/15/2022    INDICATION FOR PROCEDURE:  The patient has chronic exertional chest  pain, class III angina, with shortness of breath. She did undergo  nuclear scanning that showed no significant ischemia. However, with the  complaints of exertional chest pressure and shortness of breath, I am  concerned about balanced three-vessel coronary artery disease _____  cardiac catheterization _____ for the CAD at this juncture. The  patient's Hamlet's test in the right wrist was within normal limits and  the right radial artery was chosen at the access point. The start time  of the procedure was 10:57 am and the end time of the procedure was  11:11 am.  The patient was given 1.5 mg of Versed in divide doses IV  push and 75 mcg of fentanyl IV push in divided dose as well. This was  done by my order and given by qualified independent observing nurse,  Jatinder Delgado RN. Myself and the cardiac team monitored the cardiopulmonary  status of the patient for the entire procedure. Using modified Seldinger technique, a 5/6 Slender sheath was advanced  into the right radial artery. It was aspirated and flushed. The usual  cocktail of verapamil, heparin, and nitroglycerin was given through the  sheath. A Wholey wire was advanced with JR4 catheter into the left ventricle  easily. The LVEDP was 18 mmHg. Hand injection in the VERGARA projection  shows an LV ejection fraction of 50% without wall motion abnormality. There is no mitral regurgitation.   There is no gradient across the  aortic valve upon

## 2022-03-23 ENCOUNTER — OFFICE VISIT (OUTPATIENT)
Dept: CARDIOLOGY CLINIC | Age: 77
End: 2022-03-23
Payer: MEDICARE

## 2022-03-23 VITALS
WEIGHT: 220 LBS | DIASTOLIC BLOOD PRESSURE: 70 MMHG | HEART RATE: 77 BPM | SYSTOLIC BLOOD PRESSURE: 128 MMHG | BODY MASS INDEX: 36.61 KG/M2

## 2022-03-23 DIAGNOSIS — I10 HTN (HYPERTENSION), BENIGN: Primary | ICD-10-CM

## 2022-03-23 PROCEDURE — 99214 OFFICE O/P EST MOD 30 MIN: CPT | Performed by: INTERNAL MEDICINE

## 2022-03-23 RX ORDER — BUMETANIDE 0.5 MG/1
0.5 TABLET ORAL DAILY
Qty: 90 TABLET | Refills: 1 | Status: SHIPPED | OUTPATIENT
Start: 2022-03-23 | End: 2022-06-23 | Stop reason: ALTCHOICE

## 2022-03-23 RX ORDER — RANOLAZINE 500 MG/1
500 TABLET, EXTENDED RELEASE ORAL 2 TIMES DAILY
Qty: 60 TABLET | Refills: 3 | Status: SHIPPED | OUTPATIENT
Start: 2022-03-23 | End: 2022-08-01

## 2022-03-23 NOTE — PROGRESS NOTES
[address] Reason for Consultation/Chief Complaint: \"I have been having swelling and HTN. \"       History of Present Illness:  Barrie Lundy is a 78 y.o. patient who presented to the hospital with complaints of sob and chest pains. MPI was negative for ischemia. Coronary angio was WNL. Pt is sob. She may have microvascular angina +/- diastolic dysfunction although echo doesn't show this in 3/22. Glucose 130-190 over past year but is not treated for Dm and this may contribute to microvascular dysfunction. Past Medical History:   has a past medical history of Diastolic dysfunction, Fibromyalgia, Hypertension, and Osteoarthritis. Surgical History:   has a past surgical history that includes Cholecystectomy; Breast surgery; Hysterectomy; bladder suspension; Colon surgery (1980); joint replacement; Cervical spine surgery (2012); Upper gastrointestinal endoscopy (N/A, 2/25/2019); and Breast cyst excision. Social History:   reports that she has never smoked. She has never used smokeless tobacco. She reports that she does not drink alcohol and does not use drugs. Family History:  No evidence for sudden cardiac death or premature CAD    Home Medications:  Were reviewed and are listed in nursing record. and/or listed below  Prior to Admission medications    Medication Sig Start Date End Date Taking?  Authorizing Provider   amitriptyline (ELAVIL) 10 MG tablet Take 10 mg by mouth nightly   Yes Historical Provider, MD   pravastatin (PRAVACHOL) 20 MG tablet Take 20 mg by mouth daily   Yes Historical Provider, MD   spironolactone (ALDACTONE) 25 MG tablet TAKE ONE Tablet BY MOUTH EVERY DAY 1/28/22  Yes Historical Provider, MD   bumetanide (BUMEX) 0.5 MG tablet Take 0.5 mg by mouth daily   Yes Historical Provider, MD   isosorbide mononitrate (IMDUR) 30 MG extended release tablet Take 30 mg by mouth daily    Yes Historical Provider, MD   aspirin 81 MG tablet Take 81 mg by mouth daily   Yes Historical Provider, MD   metoprolol succinate (TOPROL XL) 25 MG extended release tablet Take 12.5 mg by mouth daily    Yes Historical Provider, MD   MYRBETRIQ 25 MG TB24  2/20/19  Yes Historical Provider, MD   sertraline (ZOLOFT) 25 MG tablet Take 25 mg by mouth  2/20/19  Yes Historical Provider, MD   calcium-vitamin D (OSCAL-500) 500-200 MG-UNIT per tablet Take 1 tablet by mouth daily   Yes Historical Provider, MD   acetaminophen (TYLENOL) 500 MG tablet Take 1,000 mg by mouth every 6 hours as needed. Yes Historical Provider, MD   lisinopril (PRINIVIL;ZESTRIL) 40 MG tablet Take 40 mg by mouth daily. Yes Historical Provider, MD   esomeprazole (NEXIUM) 40 MG capsule Take 40 mg by mouth every morning (before breakfast). Yes Historical Provider, MD    takes nitropatch instead of imdur and doesn't take metoprolol. Hospital Medications: Allergies:  Alendronate, Atorvastatin, Cefdinir, Duloxetine, Duloxetine hcl, Erythromycin, Furosemide, Nsaids, Paroxetine hcl, Pregabalin, and Sulfa antibiotics     Review of Systems:     A 14 ROS obtained and negative except as mentioned in HPI. · Constitutional: there has been no unanticipated weight loss. · Eyes: No visual changes or diplopia. No scleral icterus. · ENT: No Headaches, hearing loss or vertigo. No mouth sores or sore throat. · Cardiovascular: No loss of consciousness. No hemoptysis, pleuritic pain, or phlebitis. · Respiratory: No cough or wheezing, no sputum production. No hematemesis. · Gastrointestinal: No abdominal pain, appetite loss, blood in stools. No change in bowel or bladder habits. · Genitourinary: No dysuria, or hematuria. · Musculoskeletal:  No gait disturbance, weakness or joint complaints. · Integumentary: No rash or pruritis. · Neurological: No headache, diplopia,numbness or tingling. No change in gait, balance, coordination, mood, affect, memory, mentation, behavior.   · Psychiatric: No anxiety,  · Endocrine: No malaise,  · Hematologic/Lymphatic: No abnormal bruising  · Allergic/Immunologic: No nasal congestion      Physical Examination:    Vitals:    03/23/22 1359   BP: 128/70   Pulse: 77    Weight: 220 lb (99.8 kg)         General Appearance:  Alert, cooperative, no distress, appears stated age   Head:  Normocephalic, without obvious abnormality, atraumatic   Eyes:  PERRL   Nose: Nares normal,   Neck: Supple, JVP normal    Lungs:   Clear to auscultation bilaterally, respirations unlabored   Chest Wall:  No tenderness or deformity   Heart:  regular rate and rhythm, normal S1, S2 normal, no murmur, No rub. No S3 / S4 gallop   Abdomen:   Soft, non-tender, +bowel sounds   Extremities: no cyanosis, no clubbing , No edema   Pulses: Symmetric extremities   Skin: no gross lesions or rashes   Pysch: Normal mood and affect   Neurologic: No gross deficits. CN II - XII grossly intact        Labs  CBC:   Lab Results   Component Value Date    WBC 4.7 03/15/2022    RBC 3.65 03/15/2022    HGB 10.6 03/15/2022    HCT 31.7 03/15/2022    MCV 86.9 03/15/2022    RDW 15.2 03/15/2022     03/15/2022     CMP:    Lab Results   Component Value Date     03/15/2022    K 4.7 03/15/2022    K 4.0 02/21/2022     03/15/2022    CO2 25 03/15/2022    BUN 17 03/15/2022    CREATININE 0.9 03/15/2022    GFRAA >60 03/15/2022    AGRATIO 1.6 03/15/2022    LABGLOM >60 03/15/2022    GLUCOSE 131 03/15/2022    PROT 6.5 03/15/2022    CALCIUM 9.2 03/15/2022    BILITOT <0.2 03/15/2022    ALKPHOS 55 03/15/2022    AST 19 03/15/2022    ALT 32 03/15/2022       EKG:  SR with minimal nonspecific changes. I had the opportunity to review the clinical symptoms and presentation of Shakir Bhatia2. MPI was normal on 2/22/22. Sob persists with normal coronary arteries at cath. Back pain has abated. Probable Diastolic dysfunction and or microvascular angina:  Start ranexa 500 mg po bid and renew bumetanide.     Thank you for allowing to us to participate in the care or Shakir Gonzales 1762. The note was completed using EMR. Every effort was made to ensure accuracy; however, inadvertent computerized transcription errors may be present.        Shira May MD [facility]

## 2022-03-25 DIAGNOSIS — I10 HTN (HYPERTENSION), BENIGN: ICD-10-CM

## 2022-03-25 LAB
A/G RATIO: 1.9 (ref 1.1–2.2)
ALBUMIN SERPL-MCNC: 4.3 G/DL (ref 3.4–5)
ALP BLD-CCNC: 57 U/L (ref 40–129)
ALT SERPL-CCNC: 32 U/L (ref 10–40)
ANION GAP SERPL CALCULATED.3IONS-SCNC: 12 MMOL/L (ref 3–16)
AST SERPL-CCNC: 20 U/L (ref 15–37)
BASOPHILS ABSOLUTE: 0 K/UL (ref 0–0.2)
BASOPHILS RELATIVE PERCENT: 0.3 %
BILIRUB SERPL-MCNC: <0.2 MG/DL (ref 0–1)
BUN BLDV-MCNC: 17 MG/DL (ref 7–20)
CALCIUM SERPL-MCNC: 9.1 MG/DL (ref 8.3–10.6)
CHLORIDE BLD-SCNC: 103 MMOL/L (ref 99–110)
CHOLESTEROL, TOTAL: 181 MG/DL (ref 0–199)
CO2: 23 MMOL/L (ref 21–32)
CREAT SERPL-MCNC: 1 MG/DL (ref 0.6–1.2)
EOSINOPHILS ABSOLUTE: 0.1 K/UL (ref 0–0.6)
EOSINOPHILS RELATIVE PERCENT: 3.1 %
GFR AFRICAN AMERICAN: >60
GFR NON-AFRICAN AMERICAN: 54
GLUCOSE BLD-MCNC: 134 MG/DL (ref 70–99)
HCT VFR BLD CALC: 34.1 % (ref 36–48)
HDLC SERPL-MCNC: 42 MG/DL (ref 40–60)
HEMOGLOBIN: 11.3 G/DL (ref 12–16)
IRON SATURATION: 28 % (ref 15–50)
IRON: 61 UG/DL (ref 37–145)
LDL CHOLESTEROL CALCULATED: 109 MG/DL
LYMPHOCYTES ABSOLUTE: 1.5 K/UL (ref 1–5.1)
LYMPHOCYTES RELATIVE PERCENT: 33.8 %
MCH RBC QN AUTO: 28.6 PG (ref 26–34)
MCHC RBC AUTO-ENTMCNC: 33.2 G/DL (ref 31–36)
MCV RBC AUTO: 86.1 FL (ref 80–100)
MONOCYTES ABSOLUTE: 0.4 K/UL (ref 0–1.3)
MONOCYTES RELATIVE PERCENT: 8.6 %
NEUTROPHILS ABSOLUTE: 2.4 K/UL (ref 1.7–7.7)
NEUTROPHILS RELATIVE PERCENT: 54.2 %
PDW BLD-RTO: 14.6 % (ref 12.4–15.4)
PLATELET # BLD: 126 K/UL (ref 135–450)
PMV BLD AUTO: 8.2 FL (ref 5–10.5)
POTASSIUM SERPL-SCNC: 4.5 MMOL/L (ref 3.5–5.1)
RBC # BLD: 3.96 M/UL (ref 4–5.2)
SODIUM BLD-SCNC: 138 MMOL/L (ref 136–145)
TOTAL IRON BINDING CAPACITY: 218 UG/DL (ref 260–445)
TOTAL PROTEIN: 6.6 G/DL (ref 6.4–8.2)
TRIGL SERPL-MCNC: 151 MG/DL (ref 0–150)
VLDLC SERPL CALC-MCNC: 30 MG/DL
WBC # BLD: 4.4 K/UL (ref 4–11)

## 2022-03-26 LAB
ESTIMATED AVERAGE GLUCOSE: 128.4 MG/DL
HBA1C MFR BLD: 6.1 %

## 2022-04-26 NOTE — ADDENDUM NOTE
Addended by: Cyndie Pachceo on: 3/23/2022 02:33 PM     Modules accepted: Orders
Airway patent. Mouth with normal mucosa.

## 2022-06-16 ENCOUNTER — APPOINTMENT (RX ONLY)
Dept: URBAN - METROPOLITAN AREA CLINIC 170 | Facility: CLINIC | Age: 77
Setting detail: DERMATOLOGY
End: 2022-06-16

## 2022-06-16 DIAGNOSIS — D485 NEOPLASM OF UNCERTAIN BEHAVIOR OF SKIN: ICD-10-CM

## 2022-06-16 PROBLEM — D48.5 NEOPLASM OF UNCERTAIN BEHAVIOR OF SKIN: Status: ACTIVE | Noted: 2022-06-16

## 2022-06-16 PROCEDURE — 11103 TANGNTL BX SKIN EA SEP/ADDL: CPT

## 2022-06-16 PROCEDURE — ? ADDITIONAL NOTES

## 2022-06-16 PROCEDURE — ? BIOPSY BY SHAVE METHOD

## 2022-06-16 PROCEDURE — 11102 TANGNTL BX SKIN SINGLE LES: CPT

## 2022-06-16 ASSESSMENT — LOCATION DETAILED DESCRIPTION DERM
LOCATION DETAILED: LEFT NASAL ALA
LOCATION DETAILED: LEFT DISTAL DORSAL FOREARM
LOCATION DETAILED: LEFT DISTAL DORSAL FOREARM

## 2022-06-16 ASSESSMENT — LOCATION SIMPLE DESCRIPTION DERM
LOCATION SIMPLE: LEFT NOSE
LOCATION SIMPLE: LEFT FOREARM
LOCATION SIMPLE: LEFT FOREARM

## 2022-06-16 ASSESSMENT — LOCATION ZONE DERM
LOCATION ZONE: ARM
LOCATION ZONE: NOSE
LOCATION ZONE: ARM

## 2022-06-21 ENCOUNTER — TELEPHONE (OUTPATIENT)
Dept: CARDIOLOGY CLINIC | Age: 77
End: 2022-06-21

## 2022-06-22 NOTE — TELEPHONE ENCOUNTER
Per pt's old Cardiologist, he recommended that pt take 1 mg of Bumex daily. Pt ran out of medication and had some old Spironolactone. She has been taking two 25 mg tablets and feels she gets better results with the Spironolactone. Pt is wondering if taking one or two is the proper dose and she would like to go back on that.   Will address with Dr Edelmira Wilson

## 2022-06-23 RX ORDER — SPIRONOLACTONE 25 MG/1
TABLET ORAL
Qty: 90 TABLET | Refills: 2 | Status: SHIPPED | OUTPATIENT
Start: 2022-06-23 | End: 2022-08-10 | Stop reason: SDUPTHER

## 2022-07-29 ENCOUNTER — APPOINTMENT (RX ONLY)
Dept: URBAN - METROPOLITAN AREA CLINIC 170 | Facility: CLINIC | Age: 77
Setting detail: DERMATOLOGY
End: 2022-07-29

## 2022-07-29 DIAGNOSIS — Z02.9 ENCOUNTER FOR ADMINISTRATIVE EXAMINATIONS, UNSPECIFIED: ICD-10-CM

## 2022-08-01 RX ORDER — RANOLAZINE 500 MG/1
TABLET, EXTENDED RELEASE ORAL
Qty: 60 TABLET | Refills: 3 | Status: SHIPPED | OUTPATIENT
Start: 2022-08-01 | End: 2022-08-10 | Stop reason: SDUPTHER

## 2022-08-06 ENCOUNTER — APPOINTMENT (RX ONLY)
Dept: URBAN - METROPOLITAN AREA CLINIC 170 | Facility: CLINIC | Age: 77
Setting detail: DERMATOLOGY
End: 2022-08-06

## 2022-08-06 PROBLEM — C44.311 BASAL CELL CARCINOMA OF SKIN OF NOSE: Status: ACTIVE | Noted: 2022-08-06

## 2022-08-06 PROCEDURE — 14060 TIS TRNFR E/N/E/L 10 SQ CM/<: CPT

## 2022-08-06 PROCEDURE — ? MOHS SURGERY

## 2022-08-06 PROCEDURE — 17312 MOHS ADDL STAGE: CPT

## 2022-08-06 PROCEDURE — 17311 MOHS 1 STAGE H/N/HF/G: CPT

## 2022-08-06 PROCEDURE — ? ADDITIONAL NOTES

## 2022-08-06 NOTE — PROCEDURE: MOHS SURGERY
6 Skin Substitute Text: The defect edges were debeveled with a #15 scalpel blade.  Given the location of the defect, shape of the defect and the proximity to free margins a skin substitute graft was deemed most appropriate.  The graft material was trimmed to fit the size of the defect. The graft was then placed in the primary defect and oriented appropriately.

## 2022-08-06 NOTE — PROCEDURE: MOHS SURGERY
Clindamycin Counseling: I counseled the patient regarding use of clindamycin as an antibiotic for prophylactic and/or therapeutic purposes. Clindamycin is active against numerous classes of bacteria, including skin bacteria. Side effects may include nausea, diarrhea, gastrointestinal upset, rash, hives, yeast infections, and in rare cases, colitis. Island Pedicle Flap Text: The defect edges were debeveled with a #15 scalpel blade.  Given the location of the defect, shape of the defect and the proximity to free margins an island pedicle advancement flap was deemed most appropriate.  Using a sterile surgical marker, an appropriate advancement flap was drawn incorporating the defect, outlining the appropriate donor tissue and placing the expected incisions within the relaxed skin tension lines where possible.    The area thus outlined was incised deep to adipose tissue with a #15 scalpel blade.  The skin margins were undermined to an appropriate distance in all directions around the primary defect and laterally outward around the island pedicle utilizing iris scissors.  There was minimal undermining beneath the pedicle flap.

## 2022-08-08 ENCOUNTER — APPOINTMENT (RX ONLY)
Dept: URBAN - METROPOLITAN AREA CLINIC 170 | Facility: CLINIC | Age: 77
Setting detail: DERMATOLOGY
End: 2022-08-08

## 2022-08-08 DIAGNOSIS — Z48.817 ENCOUNTER FOR SURGICAL AFTERCARE FOLLOWING SURGERY ON THE SKIN AND SUBCUTANEOUS TISSUE: ICD-10-CM

## 2022-08-08 DIAGNOSIS — Z48.02 ENCOUNTER FOR REMOVAL OF SUTURES: ICD-10-CM

## 2022-08-08 PROCEDURE — ? PHOTO-DOCUMENTATION

## 2022-08-08 PROCEDURE — ? POST-OP WOUND CHECK

## 2022-08-08 PROCEDURE — ? SUTURE REMOVAL (GLOBAL PERIOD)

## 2022-08-08 ASSESSMENT — LOCATION SIMPLE DESCRIPTION DERM: LOCATION SIMPLE: LEFT NOSE

## 2022-08-08 ASSESSMENT — LOCATION ZONE DERM: LOCATION ZONE: NOSE

## 2022-08-08 ASSESSMENT — LOCATION DETAILED DESCRIPTION DERM: LOCATION DETAILED: LEFT NASAL ALA

## 2022-08-08 NOTE — PROCEDURE: PHOTO-DOCUMENTATION
Detail Level: Zone
Photo Preface (Leave Blank If You Do Not Want): Photographs were obtained today
Details (Free Text): Nose

## 2022-08-08 NOTE — PROCEDURE: POST-OP WOUND CHECK
Add 14010 Cpt? (Important Note: In 2017 The Use Of 46530 Is Being Tracked By Cms To Determine Future Global Period Reimbursement For Global Periods): no

## 2022-08-10 ENCOUNTER — OFFICE VISIT (OUTPATIENT)
Dept: CARDIOLOGY CLINIC | Age: 77
End: 2022-08-10
Payer: MEDICARE

## 2022-08-10 VITALS
HEART RATE: 73 BPM | WEIGHT: 212 LBS | BODY MASS INDEX: 35.28 KG/M2 | DIASTOLIC BLOOD PRESSURE: 64 MMHG | SYSTOLIC BLOOD PRESSURE: 132 MMHG

## 2022-08-10 DIAGNOSIS — G89.29 CHRONIC CHEST PAIN: Primary | ICD-10-CM

## 2022-08-10 DIAGNOSIS — E78.5 HYPERLIPIDEMIA, UNSPECIFIED HYPERLIPIDEMIA TYPE: ICD-10-CM

## 2022-08-10 DIAGNOSIS — R60.1 GENERALIZED EDEMA: ICD-10-CM

## 2022-08-10 DIAGNOSIS — R07.9 CHRONIC CHEST PAIN: Primary | ICD-10-CM

## 2022-08-10 DIAGNOSIS — I10 HTN (HYPERTENSION), BENIGN: ICD-10-CM

## 2022-08-10 PROCEDURE — 1123F ACP DISCUSS/DSCN MKR DOCD: CPT | Performed by: INTERNAL MEDICINE

## 2022-08-10 PROCEDURE — 99214 OFFICE O/P EST MOD 30 MIN: CPT | Performed by: INTERNAL MEDICINE

## 2022-08-10 RX ORDER — RANOLAZINE 500 MG/1
500 TABLET, EXTENDED RELEASE ORAL 2 TIMES DAILY
Qty: 180 TABLET | Refills: 3 | Status: SHIPPED | OUTPATIENT
Start: 2022-08-10

## 2022-08-10 RX ORDER — SPIRONOLACTONE 25 MG/1
TABLET ORAL
Qty: 90 TABLET | Refills: 3 | Status: SHIPPED | OUTPATIENT
Start: 2022-08-10

## 2022-08-10 NOTE — PROGRESS NOTES
[address]. Reason for Consultation/Chief Complaint: \"I have been having swelling and HTN. \"       History of Present Illness:  Mac Pope is a 78 y.o. patient who presented to the hospital with complaints of sob and chest pains. MPI was negative for ischemia. Coronary angio was WNL. Pt is sob. She may have microvascular angina +/- diastolic dysfunction although echo doesn't show this in 3/22. Glucose 130-190 over past year but is not treated for Dm and this may contribute to microvascular dysfunction. Past Medical History:   has a past medical history of Diastolic dysfunction, Fibromyalgia, Hypertension, and Osteoarthritis. Surgical History:   has a past surgical history that includes Cholecystectomy; Breast surgery; Hysterectomy; bladder suspension; Colon surgery (1980); joint replacement; Cervical spine surgery (2012); Upper gastrointestinal endoscopy (N/A, 2/25/2019); and Breast cyst excision. Social History:   reports that she has never smoked. She has never used smokeless tobacco. She reports that she does not drink alcohol and does not use drugs. Family History:  No evidence for sudden cardiac death or premature CAD    Home Medications:  Were reviewed and are listed in nursing record. and/or listed below  Prior to Admission medications    Medication Sig Start Date End Date Taking?  Authorizing Provider   ranolazine (RANEXA) 500 MG extended release tablet TAKE ONE TABLET BY MOUTH TWICE DAILY 8/1/22  Yes Christina Garcia MD   spironolactone (ALDACTONE) 25 MG tablet TAKE ONE TABLET BY MOUTH DAILY 6/23/22  Yes Christina Garcia MD   amitriptyline (ELAVIL) 10 MG tablet Take 10 mg by mouth nightly   Yes Historical Provider, MD   pravastatin (PRAVACHOL) 20 MG tablet Take 20 mg by mouth daily   Yes Historical Provider, MD   isosorbide mononitrate (IMDUR) 30 MG extended release tablet Take 30 mg by mouth daily    Yes Historical Provider, MD aspirin 81 MG tablet Take 81 mg by mouth daily   Yes Historical Provider, MD   metoprolol succinate (TOPROL XL) 25 MG extended release tablet Take 12.5 mg by mouth daily    Yes Historical Provider, MD   MYRBETRIQ 25 MG TB24  2/20/19  Yes Historical Provider, MD   sertraline (ZOLOFT) 25 MG tablet Take 25 mg by mouth  2/20/19  Yes Historical Provider, MD   calcium-vitamin D (OSCAL-500) 500-200 MG-UNIT per tablet Take 1 tablet by mouth daily   Yes Historical Provider, MD   acetaminophen (TYLENOL) 500 MG tablet Take 1,000 mg by mouth every 6 hours as needed. Yes Historical Provider, MD   lisinopril (PRINIVIL;ZESTRIL) 40 MG tablet Take 40 mg by mouth daily. Yes Historical Provider, MD   esomeprazole (NEXIUM) 40 MG delayed release capsule Take 40 mg by mouth every morning (before breakfast). Yes Historical Provider, MD    takes nitropatch instead of imdur and doesn't take metoprolol. Hospital Medications: Allergies:  Alendronate, Atorvastatin, Cefdinir, Duloxetine, Duloxetine hcl, Erythromycin, Furosemide, Nsaids, Paroxetine hcl, Pregabalin, and Sulfa antibiotics     Review of Systems:     A 14 ROS obtained and negative except as mentioned in HPI. Constitutional: there has been no unanticipated weight loss. Eyes: No visual changes or diplopia. No scleral icterus. ENT: No Headaches, hearing loss or vertigo. No mouth sores or sore throat. Cardiovascular: No loss of consciousness. No hemoptysis, pleuritic pain, or phlebitis. Respiratory: No cough or wheezing, no sputum production. No hematemesis. Gastrointestinal: No abdominal pain, appetite loss, blood in stools. No change in bowel or bladder habits. Genitourinary: No dysuria, or hematuria. Musculoskeletal:  No gait disturbance, weakness or joint complaints. Integumentary: No rash or pruritis. Neurological: No headache, diplopia,numbness or tingling.  No change in gait, balance, coordination, mood, affect, memory, mentation, behavior. Psychiatric: No anxiety,  Endocrine: No malaise,  Hematologic/Lymphatic: No abnormal bruising  Allergic/Immunologic: No nasal congestion      Physical Examination:    Vitals:    08/10/22 1437   BP: 132/64   Pulse: 73    Weight: 212 lb (96.2 kg)         General Appearance:  Alert, cooperative, no distress, appears stated age   Head:  Normocephalic, without obvious abnormality, atraumatic   Eyes:  PERRL   Nose: Nares normal,   Neck: Supple, JVP normal    Lungs:   Clear to auscultation bilaterally, respirations unlabored   Chest Wall:  No tenderness or deformity   Heart:  regular rate and rhythm, normal S1, S2 normal, no murmur, No rub. No S3 / S4 gallop   Abdomen:   Soft, non-tender, +bowel sounds   Extremities: no cyanosis, no clubbing , No edema   Pulses: Symmetric extremities   Skin: no gross lesions or rashes   Pysch: Normal mood and affect   Neurologic: No gross deficits. CN II - XII grossly intact        Labs  CBC:   Lab Results   Component Value Date/Time    WBC 4.4 03/25/2022 09:17 AM    RBC 3.96 03/25/2022 09:17 AM    HGB 11.3 03/25/2022 09:17 AM    HCT 34.1 03/25/2022 09:17 AM    MCV 86.1 03/25/2022 09:17 AM    RDW 14.6 03/25/2022 09:17 AM     03/25/2022 09:17 AM     CMP:    Lab Results   Component Value Date/Time     03/25/2022 09:17 AM    K 4.5 03/25/2022 09:17 AM    K 4.0 02/21/2022 09:15 PM     03/25/2022 09:17 AM    CO2 23 03/25/2022 09:17 AM    BUN 17 03/25/2022 09:17 AM    CREATININE 1.0 03/25/2022 09:17 AM    GFRAA >60 03/25/2022 09:17 AM    AGRATIO 1.9 03/25/2022 09:17 AM    LABGLOM 54 03/25/2022 09:17 AM    GLUCOSE 134 03/25/2022 09:17 AM    PROT 6.6 03/25/2022 09:17 AM    CALCIUM 9.1 03/25/2022 09:17 AM    BILITOT <0.2 03/25/2022 09:17 AM    ALKPHOS 57 03/25/2022 09:17 AM    AST 20 03/25/2022 09:17 AM    ALT 32 03/25/2022 09:17 AM       EKG:  SR with minimal nonspecific changes. Diagnosis Orders   1. Chronic chest pain        2.  Hyperlipidemia, unspecified hyperlipidemia type        3. HTN (hypertension), benign        4. Generalized edema           Chronic chest pain:  ranolazine is helping a lot. HLP:  controlled with pravachol  HTN  controlled with lisinopril  EdeMA: controlled with spironolactone.      Keith Woods MD [facility] [place]

## 2022-08-22 ENCOUNTER — APPOINTMENT (RX ONLY)
Dept: URBAN - METROPOLITAN AREA CLINIC 16 | Facility: CLINIC | Age: 77
Setting detail: DERMATOLOGY
End: 2022-08-22

## 2022-08-22 DIAGNOSIS — Z48.02 ENCOUNTER FOR REMOVAL OF SUTURES: ICD-10-CM

## 2022-08-22 PROCEDURE — ? PHOTO-DOCUMENTATION

## 2022-08-22 PROCEDURE — ? SUTURE REMOVAL (GLOBAL PERIOD)

## 2022-08-22 PROCEDURE — ? ADDITIONAL NOTES

## 2022-08-22 ASSESSMENT — LOCATION ZONE DERM: LOCATION ZONE: NOSE

## 2022-08-22 ASSESSMENT — LOCATION SIMPLE DESCRIPTION DERM: LOCATION SIMPLE: LEFT NOSE

## 2022-08-22 ASSESSMENT — LOCATION DETAILED DESCRIPTION DERM: LOCATION DETAILED: LEFT NASAL ALA

## 2022-08-22 NOTE — PROCEDURE: SUTURE REMOVAL (GLOBAL PERIOD)
Detail Level: Detailed
Add 58128 Cpt? (Important Note: In 2017 The Use Of 56546 Is Being Tracked By Cms To Determine Future Global Period Reimbursement For Global Periods): yes

## 2022-08-22 NOTE — PROCEDURE: PHOTO-DOCUMENTATION
Details (Free Text): Left side nose
Detail Level: Detailed
Photo Preface (Leave Blank If You Do Not Want): Photographs were taken today:

## 2022-11-16 ENCOUNTER — APPOINTMENT (RX ONLY)
Dept: URBAN - METROPOLITAN AREA CLINIC 170 | Facility: CLINIC | Age: 77
Setting detail: DERMATOLOGY
End: 2022-11-16

## 2022-11-16 DIAGNOSIS — Z85.828 PERSONAL HISTORY OF OTHER MALIGNANT NEOPLASM OF SKIN: ICD-10-CM

## 2022-11-16 DIAGNOSIS — L82.1 OTHER SEBORRHEIC KERATOSIS: ICD-10-CM

## 2022-11-16 DIAGNOSIS — L57.0 ACTINIC KERATOSIS: ICD-10-CM

## 2022-11-16 PROCEDURE — 99212 OFFICE O/P EST SF 10 MIN: CPT | Mod: 25

## 2022-11-16 PROCEDURE — ? COUNSELING

## 2022-11-16 PROCEDURE — ? LIQUID NITROGEN

## 2022-11-16 PROCEDURE — 17000 DESTRUCT PREMALG LESION: CPT

## 2022-11-16 ASSESSMENT — LOCATION DETAILED DESCRIPTION DERM
LOCATION DETAILED: LEFT MEDIAL UPPER BACK
LOCATION DETAILED: LEFT NASAL ALA
LOCATION DETAILED: LEFT MEDIAL MALAR CHEEK

## 2022-11-16 ASSESSMENT — LOCATION ZONE DERM
LOCATION ZONE: TRUNK
LOCATION ZONE: NOSE
LOCATION ZONE: FACE

## 2022-11-16 ASSESSMENT — LOCATION SIMPLE DESCRIPTION DERM
LOCATION SIMPLE: LEFT UPPER BACK
LOCATION SIMPLE: LEFT CHEEK
LOCATION SIMPLE: LEFT NOSE

## 2023-01-05 ENCOUNTER — HOSPITAL ENCOUNTER (OUTPATIENT)
Dept: MAMMOGRAPHY | Age: 78
Discharge: HOME | End: 2023-01-05
Payer: MEDICARE

## 2023-01-05 VITALS — WEIGHT: 212 LBS | HEIGHT: 65 IN | BODY MASS INDEX: 35.32 KG/M2

## 2023-01-05 DIAGNOSIS — Z12.31 VISIT FOR SCREENING MAMMOGRAM: ICD-10-CM

## 2023-01-05 PROCEDURE — 77063 BREAST TOMOSYNTHESIS BI: CPT

## 2023-02-16 ENCOUNTER — HOSPITAL ENCOUNTER (OUTPATIENT)
Dept: MAMMOGRAPHY | Age: 78
Discharge: HOME | End: 2023-02-16
Payer: MEDICARE

## 2023-02-16 ENCOUNTER — HOSPITAL ENCOUNTER (OUTPATIENT)
Dept: ULTRASOUND IMAGING | Age: 78
Discharge: HOME | End: 2023-02-16
Payer: MEDICARE

## 2023-02-16 DIAGNOSIS — R93.89 ABNORMAL ULTRASOUND: ICD-10-CM

## 2023-02-16 DIAGNOSIS — N63.20 MASS OF LEFT BREAST, UNSPECIFIED QUADRANT: ICD-10-CM

## 2023-02-16 PROCEDURE — 76642 ULTRASOUND BREAST LIMITED: CPT

## 2023-02-16 PROCEDURE — 2709999900 US ASP BREAST CYST LEFT

## 2023-02-16 PROCEDURE — 19000 PUNCTURE ASPIR CYST BREAST: CPT

## 2023-02-27 ENCOUNTER — HOSPITAL ENCOUNTER (OUTPATIENT)
Dept: GENERAL RADIOLOGY | Age: 78
Discharge: HOME | End: 2023-02-27
Payer: MEDICARE

## 2023-02-27 DIAGNOSIS — R13.14 DYSPHAGIA, PHARYNGOESOPHAGEAL PHASE: ICD-10-CM

## 2023-02-27 PROCEDURE — 74220 X-RAY XM ESOPHAGUS 1CNTRST: CPT

## 2023-03-15 ENCOUNTER — OFFICE VISIT (OUTPATIENT)
Dept: CARDIOLOGY CLINIC | Age: 78
End: 2023-03-15
Payer: MEDICARE

## 2023-03-15 VITALS
HEART RATE: 75 BPM | BODY MASS INDEX: 35.31 KG/M2 | WEIGHT: 212.2 LBS | DIASTOLIC BLOOD PRESSURE: 70 MMHG | SYSTOLIC BLOOD PRESSURE: 136 MMHG

## 2023-03-15 DIAGNOSIS — R07.9 CHEST PAIN, UNSPECIFIED TYPE: Primary | ICD-10-CM

## 2023-03-15 PROCEDURE — 93000 ELECTROCARDIOGRAM COMPLETE: CPT | Performed by: INTERNAL MEDICINE

## 2023-03-15 PROCEDURE — 99214 OFFICE O/P EST MOD 30 MIN: CPT | Performed by: INTERNAL MEDICINE

## 2023-03-15 PROCEDURE — 1123F ACP DISCUSS/DSCN MKR DOCD: CPT | Performed by: INTERNAL MEDICINE

## 2023-03-15 RX ORDER — BUMETANIDE 0.5 MG/1
TABLET ORAL
COMMUNITY
Start: 2022-04-06

## 2023-03-15 RX ORDER — SUCRALFATE 1 G/1
1 TABLET ORAL 4 TIMES DAILY
COMMUNITY

## 2023-03-15 NOTE — PROGRESS NOTES
[address]. Reason for Consultation/Chief Complaint: \"I have been having swelling and HTN. \"       History of Present Illness:  Hue Molina is a 70 y.o. patient who presented to the hospital with complaints of sob and chest pains. MPI was negative for ischemia. Coronary angio was WNL. Pt is sob. She may have microvascular angina +/- diastolic dysfunction although echo doesn't show this in 3/22. Glucose 130-190 over past year but is not treated for Dm and this may contribute to microvascular dysfunction. Past Medical History:   has a past medical history of Diastolic dysfunction, Fibromyalgia, Hypertension, and Osteoarthritis. Surgical History:   has a past surgical history that includes Cholecystectomy; Breast surgery; Hysterectomy; bladder suspension; Colon surgery (1980); joint replacement; Cervical spine surgery (2012); Upper gastrointestinal endoscopy (N/A, 02/25/2019); Breast cyst excision; and US ASP BREAST CYST LEFT (Left, 2/16/2023). Social History:   reports that she has never smoked. She has never used smokeless tobacco. She reports that she does not drink alcohol and does not use drugs. Family History:  No evidence for sudden cardiac death or premature CAD    Home Medications:  Were reviewed and are listed in nursing record. and/or listed below  Prior to Admission medications    Medication Sig Start Date End Date Taking? Authorizing Provider   sucralfate (CARAFATE) 1 GM tablet Take 1 g by mouth 4 times daily   Yes Historical Provider, MD   bumetanide (BUMEX) 0.5 MG tablet Bumetanide( 0.5MG Oral   ) Active -Hx Entry Oral for 0 4/6/22  Yes Historical Provider, MD   ranolazine (RANEXA) 500 MG extended release tablet Take 1 tablet by mouth in the morning and 1 tablet before bedtime.  8/10/22  Yes Rolan Crowder MD   spironolactone (ALDACTONE) 25 MG tablet TAKE ONE TABLET BY MOUTH DAILY 8/10/22  Yes Rolan Crowder MD amitriptyline (ELAVIL) 10 MG tablet Take 10 mg by mouth nightly   Yes Historical Provider, MD   pravastatin (PRAVACHOL) 20 MG tablet Take 20 mg by mouth daily   Yes Historical Provider, MD   aspirin 81 MG tablet Take 81 mg by mouth daily   Yes Historical Provider, MD   metoprolol succinate (TOPROL XL) 25 MG extended release tablet Take 12.5 mg by mouth daily    Yes Historical Provider, MD   MYRBETRIQ 25 MG TB24  2/20/19  Yes Historical Provider, MD   sertraline (ZOLOFT) 25 MG tablet Take 25 mg by mouth  2/20/19  Yes Historical Provider, MD   calcium-vitamin D (OSCAL-500) 500-200 MG-UNIT per tablet Take 1 tablet by mouth daily   Yes Historical Provider, MD   acetaminophen (TYLENOL) 500 MG tablet Take 1,000 mg by mouth every 6 hours as needed. Yes Historical Provider, MD   lisinopril (PRINIVIL;ZESTRIL) 40 MG tablet Take 40 mg by mouth daily. Yes Historical Provider, MD   esomeprazole (NEXIUM) 40 MG delayed release capsule Take 40 mg by mouth every morning (before breakfast). Yes Historical Provider, MD   vitamin D (CHOLECALCIFEROL) 25 MCG (1000 UT) TABS tablet Take by mouth daily    Historical Provider, MD    takes nitropatch instead of imdur and doesn't take metoprolol. Hospital Medications: Allergies:  Alendronate, Atorvastatin, Cefdinir, Duloxetine, Duloxetine hcl, Erythromycin, Furosemide, Nsaids, Paroxetine hcl, Pregabalin, and Sulfa antibiotics     Review of Systems:     A 14 ROS obtained and negative except as mentioned in HPI. Constitutional: there has been no unanticipated weight loss. Eyes: No visual changes or diplopia. No scleral icterus. ENT: No Headaches, hearing loss or vertigo. No mouth sores or sore throat. Cardiovascular: No loss of consciousness. No hemoptysis, pleuritic pain, or phlebitis. Respiratory: No cough or wheezing, no sputum production. No hematemesis. Gastrointestinal: No abdominal pain, appetite loss, blood in stools.  No change in bowel or bladder habits. Genitourinary: No dysuria, or hematuria. Musculoskeletal:  No gait disturbance, weakness or joint complaints. Integumentary: No rash or pruritis. Neurological: No headache, diplopia,numbness or tingling. No change in gait, balance, coordination, mood, affect, memory, mentation, behavior. Psychiatric: No anxiety,  Endocrine: No malaise,  Hematologic/Lymphatic: No abnormal bruising  Allergic/Immunologic: No nasal congestion      Physical Examination:    Vitals:    03/15/23 1440   BP: 136/70   Pulse: 75    Weight: 212 lb 3.2 oz (96.3 kg)         General Appearance:  Alert, cooperative, no distress, appears stated age   Head:  Normocephalic, without obvious abnormality, atraumatic   Eyes:  PERRL   Nose: Nares normal,   Neck: Supple, JVP normal    Lungs:   Clear to auscultation bilaterally, respirations unlabored   Chest Wall:  No tenderness or deformity   Heart:  regular rate and rhythm, normal S1, S2 normal, no murmur, No rub. No S3 / S4 gallop   Abdomen:   Soft, non-tender, +bowel sounds   Extremities: no cyanosis, no clubbing , No edema   Pulses: Symmetric extremities   Skin: no gross lesions or rashes   Pysch: Normal mood and affect   Neurologic: No gross deficits.   CN II - XII grossly intact        Labs  CBC:   Lab Results   Component Value Date/Time    WBC 4.4 03/25/2022 09:17 AM    RBC 3.96 03/25/2022 09:17 AM    HGB 11.3 03/25/2022 09:17 AM    HCT 34.1 03/25/2022 09:17 AM    MCV 86.1 03/25/2022 09:17 AM    RDW 14.6 03/25/2022 09:17 AM     03/25/2022 09:17 AM     CMP:    Lab Results   Component Value Date/Time     03/25/2022 09:17 AM    K 4.5 03/25/2022 09:17 AM    K 4.0 02/21/2022 09:15 PM     03/25/2022 09:17 AM    CO2 23 03/25/2022 09:17 AM    BUN 17 03/25/2022 09:17 AM    CREATININE 1.0 03/25/2022 09:17 AM    GFRAA >60 03/25/2022 09:17 AM    AGRATIO 1.9 03/25/2022 09:17 AM    LABGLOM 54 03/25/2022 09:17 AM    GLUCOSE 134 03/25/2022 09:17 AM    PROT 6.6 03/25/2022 09:17 AM CALCIUM 9.1 03/25/2022 09:17 AM    BILITOT <0.2 03/25/2022 09:17 AM    ALKPHOS 57 03/25/2022 09:17 AM    AST 20 03/25/2022 09:17 AM    ALT 32 03/25/2022 09:17 AM       EKG:  SR with minimal nonspecific changes. Diagnosis Orders   1. Chest pain, unspecified type  EKG 12 Lead         Chronic chest pain:  ranolazine is helping a lot. HLP:  controlled with pravachol  HTN  controlled with lisinopril  EdeMA: controlled with spironolactone. Continue current medications. Stable cardiovascular status.        Jose Be MD [facility] [place]

## 2023-03-16 ENCOUNTER — APPOINTMENT (RX ONLY)
Dept: URBAN - METROPOLITAN AREA CLINIC 170 | Facility: CLINIC | Age: 78
Setting detail: DERMATOLOGY
End: 2023-03-16

## 2023-03-16 DIAGNOSIS — L81.4 OTHER MELANIN HYPERPIGMENTATION: ICD-10-CM

## 2023-03-16 DIAGNOSIS — L82.1 OTHER SEBORRHEIC KERATOSIS: ICD-10-CM

## 2023-03-16 DIAGNOSIS — D18.0 HEMANGIOMA: ICD-10-CM

## 2023-03-16 DIAGNOSIS — Z85.828 PERSONAL HISTORY OF OTHER MALIGNANT NEOPLASM OF SKIN: ICD-10-CM

## 2023-03-16 DIAGNOSIS — D22 MELANOCYTIC NEVI: ICD-10-CM

## 2023-03-16 PROBLEM — D48.5 NEOPLASM OF UNCERTAIN BEHAVIOR OF SKIN: Status: ACTIVE | Noted: 2023-03-16

## 2023-03-16 PROBLEM — D22.5 MELANOCYTIC NEVI OF TRUNK: Status: ACTIVE | Noted: 2023-03-16

## 2023-03-16 PROBLEM — D18.01 HEMANGIOMA OF SKIN AND SUBCUTANEOUS TISSUE: Status: ACTIVE | Noted: 2023-03-16

## 2023-03-16 PROCEDURE — 11102 TANGNTL BX SKIN SINGLE LES: CPT

## 2023-03-16 PROCEDURE — ? TREATMENT REGIMEN

## 2023-03-16 PROCEDURE — ? COUNSELING

## 2023-03-16 PROCEDURE — 99213 OFFICE O/P EST LOW 20 MIN: CPT | Mod: 25

## 2023-03-16 PROCEDURE — ? ADDITIONAL NOTES

## 2023-03-16 PROCEDURE — ? FULL BODY SKIN EXAM

## 2023-03-16 PROCEDURE — ? BIOPSY BY SHAVE METHOD

## 2023-03-16 ASSESSMENT — LOCATION DETAILED DESCRIPTION DERM
LOCATION DETAILED: STERNAL NOTCH
LOCATION DETAILED: LEFT MEDIAL BREAST 10-11:00 REGION
LOCATION DETAILED: UPPER STERNUM
LOCATION DETAILED: MIDDLE STERNUM

## 2023-03-16 ASSESSMENT — LOCATION SIMPLE DESCRIPTION DERM
LOCATION SIMPLE: CHEST
LOCATION SIMPLE: LEFT BREAST

## 2023-03-16 ASSESSMENT — LOCATION ZONE DERM: LOCATION ZONE: TRUNK

## 2023-03-16 NOTE — HPI: EVALUATION OF SKIN LESION(S)
What Type Of Note Output Would You Prefer (Optional)?: Bullet Format
Hpi Title: Evaluation of Skin Lesions
How Severe Are Your Spot(S)?: mild
Have Your Spot(S) Been Treated In The Past?: has not been treated
Additional History: Has a spot on the right shin and wants her nose checked out very well.

## 2023-04-27 ENCOUNTER — HOSPITAL ENCOUNTER (OUTPATIENT)
Dept: ENDOSCOPY | Age: 78
Discharge: HOME | End: 2023-04-27
Payer: MEDICARE

## 2023-04-27 PROCEDURE — 3609015500 HC GASTRIC/DUODENAL MOTILITY &/OR MANOMETRY STUDY

## 2023-04-27 NOTE — PROGRESS NOTES
Time in Room: 1020   Patient verbalized understanding of Esophageal manometry study. Probe inserted into left nostril with no resistance. Study completed. Discharge instructions given. Patient denied further questions, nausea or pain. Walked to exit by this RN.   Time out of Room: [address]    Electronically signed by Jody Washington RN on 4/27/2023 at 11:05 AM

## 2023-09-20 ENCOUNTER — OFFICE VISIT (OUTPATIENT)
Dept: CARDIOLOGY CLINIC | Age: 78
End: 2023-09-20
Payer: MEDICARE

## 2023-09-20 VITALS
DIASTOLIC BLOOD PRESSURE: 80 MMHG | HEART RATE: 74 BPM | BODY MASS INDEX: 35.31 KG/M2 | SYSTOLIC BLOOD PRESSURE: 136 MMHG | WEIGHT: 212.2 LBS

## 2023-09-20 DIAGNOSIS — I10 HTN (HYPERTENSION), BENIGN: ICD-10-CM

## 2023-09-20 DIAGNOSIS — E78.5 HYPERLIPIDEMIA, UNSPECIFIED HYPERLIPIDEMIA TYPE: Primary | ICD-10-CM

## 2023-09-20 DIAGNOSIS — I20.8 OTHER FORMS OF ANGINA PECTORIS (HCC): ICD-10-CM

## 2023-09-20 PROCEDURE — 3079F DIAST BP 80-89 MM HG: CPT | Performed by: INTERNAL MEDICINE

## 2023-09-20 PROCEDURE — 1123F ACP DISCUSS/DSCN MKR DOCD: CPT | Performed by: INTERNAL MEDICINE

## 2023-09-20 PROCEDURE — 99214 OFFICE O/P EST MOD 30 MIN: CPT | Performed by: INTERNAL MEDICINE

## 2023-09-20 PROCEDURE — 3075F SYST BP GE 130 - 139MM HG: CPT | Performed by: INTERNAL MEDICINE

## 2023-09-20 NOTE — PROGRESS NOTES
[address] [facility] [address]. Reason for Consultation/Chief Complaint: \"I have been having swelling and HTN. \"       History of Present Illness:  Anil Hernandez is a 70 y.o. patient who presented to the hospital with complaints of sob and chest pains. MPI was negative for ischemia. Coronary angio was WNL. Pt is sob. She may have microvascular angina +/- diastolic dysfunction although echo doesn't show this in 3/22. Glucose 130-190 over past year but is not treated for Dm and this may contribute to microvascular dysfunction. Past Medical History:   has a past medical history of Diastolic dysfunction, Fibromyalgia, Hypertension, and Osteoarthritis. Surgical History:   has a past surgical history that includes Cholecystectomy; Breast surgery; Hysterectomy; bladder suspension; Colon surgery (1980); joint replacement; Cervical spine surgery (2012); Upper gastrointestinal endoscopy (N/A, 02/25/2019); Breast cyst excision; and US ASP BREAST CYST LEFT (Left, 2/16/2023). Social History:   reports that she has never smoked. She has never used smokeless tobacco. She reports that she does not drink alcohol and does not use drugs. Family History:  No evidence for sudden cardiac death or premature CAD    Home Medications:  Were reviewed and are listed in nursing record. and/or listed below  Prior to Admission medications    Medication Sig Start Date End Date Taking? Authorizing Provider   sucralfate (CARAFATE) 1 GM tablet Take 1 g by mouth 4 times daily    Historical Provider, MD   bumetanide (BUMEX) 0.5 MG tablet Bumetanide( 0.5MG Oral   ) Active -Hx Entry Oral for 0 4/6/22   Historical Provider, MD   vitamin D (CHOLECALCIFEROL) 25 MCG (1000 UT) TABS tablet Take by mouth daily    Historical Provider, MD   ranolazine (RANEXA) 500 MG extended release tablet Take 1 tablet by mouth in the morning and 1 tablet before bedtime.  8/10/22   Geovani Padilla MD   spironolactone

## 2023-09-21 RX ORDER — RANOLAZINE 500 MG/1
TABLET, EXTENDED RELEASE ORAL
Qty: 180 TABLET | Refills: 3 | Status: SHIPPED | OUTPATIENT
Start: 2023-09-21

## 2023-10-20 ENCOUNTER — APPOINTMENT (RX ONLY)
Dept: URBAN - METROPOLITAN AREA CLINIC 170 | Facility: CLINIC | Age: 78
Setting detail: DERMATOLOGY
End: 2023-10-20

## 2023-10-20 DIAGNOSIS — Z85.828 PERSONAL HISTORY OF OTHER MALIGNANT NEOPLASM OF SKIN: ICD-10-CM

## 2023-10-20 DIAGNOSIS — L72.8 OTHER FOLLICULAR CYSTS OF THE SKIN AND SUBCUTANEOUS TISSUE: ICD-10-CM

## 2023-10-20 PROCEDURE — ? PRESCRIPTION SAMPLES PROVIDED

## 2023-10-20 PROCEDURE — ? COUNSELING

## 2023-10-20 PROCEDURE — 99212 OFFICE O/P EST SF 10 MIN: CPT

## 2023-10-20 ASSESSMENT — LOCATION ZONE DERM: LOCATION ZONE: NOSE

## 2023-10-20 ASSESSMENT — LOCATION SIMPLE DESCRIPTION DERM: LOCATION SIMPLE: LEFT NOSE

## 2023-10-20 ASSESSMENT — LOCATION DETAILED DESCRIPTION DERM: LOCATION DETAILED: LEFT NASAL SIDEWALL

## 2024-02-07 NOTE — PROCEDURE: MOHS SURGERY
Outpatient Physical Therapy  DAILY TREATMENT     [facility] [place]  [address] [place] [address], [address]  [facility] [address]  Phone:  [phone]  Fax:  [phone]    Date: 02/07/2024    Patient: Harika Jose  YOB: 2005  MRN: [id number]     Time Calculation    Start time: 0430  Stop time: 0511 Time Calculation (min): 41 minutes     Chief Complaint: back problem    Visit #: 7    SUBJECTIVE:  Again reports doing well. Had a bout of long term sitting (about 3 hours) and some tailbone pain from that a few days ago.     OBJECTIVE:  Current objective measures: ROM WNL,   PT Functional Assessment Tool Used: LBDQ  PT Functional Assessment Score: 6%       Therapeutic Exercises (CPT 74244):     1. supine lumbar twist, 1min x2    2. Supine ball rolls, 1min x 2, (on set with abd bracing)    3. ball bridge, 2x 20    4. full dead bug, x 25    5. bird dog, full x 10 ea    6. seated pelvic tilt, 1min A/p, lateral 2min, NT    7. seated abdominal bracing, 1min x 2, NT    8. Shuttle, DL 7c x 1min    9. paloff press, blk band single 1min x 10    10. walk back against resistance, blue SC 1min    11. prone ball back extension, , airplanes x 15, supermans x 10    12. modified curl up, x 10, 5 sec holds    13. cat cow, x 20, to tabatha pose x 1min    14. goblet squat, 10lb x 15 to table, x 10      Therapeutic Exercise Summary: HEP instruction/performance and development. Handout provided and exercises located below:  Access Code: [email or web address]  URL: [email or web address]  Date: 01/03/2024  Prepared by: Kurt Vieyra    Exercises  - Bridge  - 1 x daily - 2 sets - 20 reps  - Supine March with Posterior Pelvic Tilt  - 1 x daily - 2 sets - 15 reps  - Prone Press Up On Elbows  - 1 x daily - 2 sets - 10 reps  - Squat with Chair Touch  - 1 x daily - 3 x weekly - 2 sets - 10-12 reps  - Bird Dog  - 1 x daily - 3 x weekly - 3 sets - 10 reps      Time-based treatments/modalities:    Physical Therapy Timed  Treatment Charges  Therapeutic exercise minutes (CPT 56792): 41 minutes    Pain rating (1-10) before treatment:  0  Pain rating (1-10) after treatment:  0    ASSESSMENT:   Response to treatment: Symptoms controlled and not exacerbated during session. Subjective symptoms with prolonged seated positioning still occurring. Load progressions tolerated well in clinic and no symptoms present in clinic. HEP compliance encouraged.        PLAN/RECOMMENDATIONS:   Plan for treatment: therapy treatment to continue next visit.  Planned interventions for next visit: continue with current treatment.          Debridement Text: The wound edges were debrided prior to proceeding with the closure to facilitate wound healing.

## 2024-02-29 ENCOUNTER — OFFICE VISIT (OUTPATIENT)
Dept: CARDIOLOGY CLINIC | Age: 79
End: 2024-02-29
Payer: MEDICARE

## 2024-02-29 VITALS
DIASTOLIC BLOOD PRESSURE: 68 MMHG | SYSTOLIC BLOOD PRESSURE: 132 MMHG | BODY MASS INDEX: 36.31 KG/M2 | WEIGHT: 218.2 LBS | HEART RATE: 64 BPM

## 2024-02-29 DIAGNOSIS — I10 HTN (HYPERTENSION), BENIGN: Primary | ICD-10-CM

## 2024-02-29 DIAGNOSIS — R60.1 GENERALIZED EDEMA: ICD-10-CM

## 2024-02-29 DIAGNOSIS — E78.5 HYPERLIPIDEMIA, UNSPECIFIED HYPERLIPIDEMIA TYPE: ICD-10-CM

## 2024-02-29 PROCEDURE — 1123F ACP DISCUSS/DSCN MKR DOCD: CPT | Performed by: INTERNAL MEDICINE

## 2024-02-29 PROCEDURE — 99214 OFFICE O/P EST MOD 30 MIN: CPT | Performed by: INTERNAL MEDICINE

## 2024-02-29 PROCEDURE — 3075F SYST BP GE 130 - 139MM HG: CPT | Performed by: INTERNAL MEDICINE

## 2024-02-29 PROCEDURE — 3078F DIAST BP <80 MM HG: CPT | Performed by: INTERNAL MEDICINE

## 2024-02-29 RX ORDER — BUSPIRONE HYDROCHLORIDE 5 MG/1
TABLET ORAL
COMMUNITY
Start: 2024-02-21

## 2024-02-29 NOTE — PROGRESS NOTES
Gastrointestinal: No abdominal pain, appetite loss, blood in stools. No change in bowel or bladder habits.  Genitourinary: No dysuria, or hematuria.  Musculoskeletal:  No gait disturbance, weakness or joint complaints.  Integumentary: No rash or pruritis.  Neurological: No headache, diplopia,numbness or tingling. No change in gait, balance, coordination, mood, affect, memory, mentation, behavior.  Psychiatric: No anxiety,  Endocrine: No malaise,  Hematologic/Lymphatic: No abnormal bruising  Allergic/Immunologic: No nasal congestion      Physical Examination:    Vitals:    02/29/24 1238   BP: 132/68   Pulse: 64    Weight - Scale: 99 kg (218 lb 3.2 oz)         General Appearance:  Alert, cooperative, no distress, appears stated age   Head:  Normocephalic, without obvious abnormality, atraumatic   Eyes:  PERRL   Nose: Nares normal,   Neck: Supple, JVP normal    Lungs:   Clear to auscultation bilaterally, respirations unlabored   Chest Wall:  No tenderness or deformity   Heart:  regular rate and rhythm, normal S1, S2 normal, no murmur, No rub. No S3 / S4 gallop   Abdomen:   Soft, non-tender, +bowel sounds   Extremities: no cyanosis, no clubbing , No edema   Pulses: Symmetric extremities   Skin: no gross lesions or rashes   Pysch: Normal mood and affect   Neurologic: No gross deficits.  CN II - XII grossly intact        Labs  CBC:   Lab Results   Component Value Date/Time    WBC 4.4 03/25/2022 09:17 AM    RBC 3.96 03/25/2022 09:17 AM    HGB 11.3 03/25/2022 09:17 AM    HCT 34.1 03/25/2022 09:17 AM    MCV 86.1 03/25/2022 09:17 AM    RDW 14.6 03/25/2022 09:17 AM     03/25/2022 09:17 AM     CMP:    Lab Results   Component Value Date/Time     03/25/2022 09:17 AM    K 4.5 03/25/2022 09:17 AM    K 4.0 02/21/2022 09:15 PM     03/25/2022 09:17 AM    CO2 23 03/25/2022 09:17 AM    BUN 17 03/25/2022 09:17 AM    CREATININE 1.0 03/25/2022 09:17 AM    GFRAA >60 03/25/2022 09:17 AM    AGRATIO 1.9 03/25/2022 09:17 AM

## 2024-03-04 ENCOUNTER — HOSPITAL ENCOUNTER (OUTPATIENT)
Dept: MAMMOGRAPHY | Age: 79
Discharge: HOME | End: 2024-03-04
Payer: MEDICARE

## 2024-03-04 VITALS — BODY MASS INDEX: 37.05 KG/M2 | HEIGHT: 64 IN | WEIGHT: 217 LBS

## 2024-03-04 DIAGNOSIS — Z12.31 VISIT FOR SCREENING MAMMOGRAM: ICD-10-CM

## 2024-03-04 PROCEDURE — 77063 BREAST TOMOSYNTHESIS BI: CPT

## 2024-04-18 ENCOUNTER — OFFICE VISIT (OUTPATIENT)
Dept: CARDIOLOGY CLINIC | Age: 79
End: 2024-04-18
Payer: MEDICARE

## 2024-04-18 VITALS
SYSTOLIC BLOOD PRESSURE: 132 MMHG | HEART RATE: 72 BPM | DIASTOLIC BLOOD PRESSURE: 80 MMHG | WEIGHT: 221 LBS | BODY MASS INDEX: 37.93 KG/M2

## 2024-04-18 DIAGNOSIS — R60.0 LOWER LEG EDEMA: ICD-10-CM

## 2024-04-18 DIAGNOSIS — E78.5 HYPERLIPIDEMIA, UNSPECIFIED HYPERLIPIDEMIA TYPE: Primary | ICD-10-CM

## 2024-04-18 DIAGNOSIS — R42 DIZZINESS: ICD-10-CM

## 2024-04-18 DIAGNOSIS — I10 HTN (HYPERTENSION), BENIGN: ICD-10-CM

## 2024-04-18 PROCEDURE — 1123F ACP DISCUSS/DSCN MKR DOCD: CPT | Performed by: INTERNAL MEDICINE

## 2024-04-18 PROCEDURE — 99213 OFFICE O/P EST LOW 20 MIN: CPT | Performed by: INTERNAL MEDICINE

## 2024-04-18 PROCEDURE — 3079F DIAST BP 80-89 MM HG: CPT | Performed by: INTERNAL MEDICINE

## 2024-04-18 PROCEDURE — 3075F SYST BP GE 130 - 139MM HG: CPT | Performed by: INTERNAL MEDICINE

## 2024-04-18 RX ORDER — TORSEMIDE 10 MG/1
10 TABLET ORAL DAILY
Qty: 30 TABLET | Refills: 3 | Status: SHIPPED | OUTPATIENT
Start: 2024-04-18

## 2024-04-18 NOTE — PROGRESS NOTES
[facility]  CARDIOLOGY  OUTPATIENT FOLLOW UP.        Reason for Consultation/Chief Complaint: \"I have been having swelling and HTN.\"       History of Present Illness:  Erinn Gan is a 79 y.o. patient who presented to the hospital with complaints of sob and chest pains.  MPI was negative for ischemia.  Coronary angio was WNL.  Pt is sob.  She may have microvascular angina +/- diastolic dysfunction although echo doesn't show this in 3/22.  Glucose 130-190 over past year but is not treated for Dm and this may contribute to microvascular dysfunction.    4/18/24;  occasional tired feeling in left arm and back.      Past Medical History:   has a past medical history of Diastolic dysfunction, Fibromyalgia, Hypertension, and Osteoarthritis.    Surgical History:   has a past surgical history that includes Cholecystectomy; Breast surgery; Hysterectomy; bladder suspension; Colon surgery (1980); joint replacement; Cervical spine surgery (2012); Upper gastrointestinal endoscopy (N/A, 02/25/2019); Breast cyst excision (Bilateral); and US ASP BREAST CYST LEFT (Left, 02/16/2023).     Social History:   reports that she has never smoked. She has never used smokeless tobacco. She reports that she does not drink alcohol and does not use drugs.     Family History:  No evidence for sudden cardiac death or premature CAD    Home Medications:  Were reviewed and are listed in nursing record. and/or listed below  Prior to Admission medications    Medication Sig Start Date End Date Taking? Authorizing Provider   torsemide (DEMADEX) 10 MG tablet Take 1 tablet by mouth daily 4/18/24  Yes Jonnathan Mar MD   busPIRone (BUSPAR) 5 MG tablet Take by mouth 2/21/24  Yes ProviderIrene MD   ranolazine (RANEXA) 500 MG extended release tablet TAKE 1 TABLET IN THE MORNING AND 1 TABLET BEFORE BEDTIME 9/21/23  Yes Jonnathan Mar MD   sucralfate (CARAFATE) 1 GM tablet Take 1 tablet by mouth 4 times daily   Yes Jo

## 2024-04-22 ENCOUNTER — HOSPITAL ENCOUNTER (OUTPATIENT)
Age: 79
Discharge: HOME | End: 2024-04-22
Payer: MEDICARE

## 2024-04-22 PROCEDURE — 6360000004 HC RX CONTRAST MEDICATION: Performed by: INTERNAL MEDICINE

## 2024-04-22 PROCEDURE — C8929 TTE W OR WO FOL WCON,DOPPLER: HCPCS

## 2024-04-22 RX ADMIN — PERFLUTREN 1.5 ML: 6.52 INJECTION, SUSPENSION INTRAVENOUS at 10:43

## 2024-04-26 ENCOUNTER — TELEPHONE (OUTPATIENT)
Dept: CARDIOLOGY CLINIC | Age: 79
End: 2024-04-26

## 2024-05-01 DIAGNOSIS — R60.1 GENERALIZED EDEMA: Primary | ICD-10-CM

## 2024-05-02 DIAGNOSIS — R60.1 GENERALIZED EDEMA: ICD-10-CM

## 2024-05-02 LAB
ANION GAP SERPL CALCULATED.3IONS-SCNC: 10 MMOL/L (ref 3–16)
BUN SERPL-MCNC: 24 MG/DL (ref 7–20)
CALCIUM SERPL-MCNC: 9.7 MG/DL (ref 8.3–10.6)
CHLORIDE SERPL-SCNC: 101 MMOL/L (ref 99–110)
CO2 SERPL-SCNC: 25 MMOL/L (ref 21–32)
CREAT SERPL-MCNC: 1.2 MG/DL (ref 0.6–1.2)
GFR SERPLBLD CREATININE-BSD FMLA CKD-EPI: 47 ML/MIN/{1.73_M2}
GLUCOSE SERPL-MCNC: 142 MG/DL (ref 70–99)
POTASSIUM SERPL-SCNC: 5.4 MMOL/L (ref 3.5–5.1)
SODIUM SERPL-SCNC: 136 MMOL/L (ref 136–145)

## 2024-05-06 RX ORDER — TORSEMIDE 10 MG/1
10 TABLET ORAL EVERY OTHER DAY
Qty: 30 TABLET | Refills: 3
Start: 2024-05-06

## 2024-05-22 ENCOUNTER — HOSPITAL ENCOUNTER (EMERGENCY)
Age: 79
Discharge: HOME | End: 2024-05-22
Attending: EMERGENCY MEDICINE
Payer: MEDICARE

## 2024-05-22 ENCOUNTER — APPOINTMENT (OUTPATIENT)
Age: 79
End: 2024-05-22
Payer: MEDICARE

## 2024-05-22 VITALS
WEIGHT: 223 LBS | TEMPERATURE: 98.4 F | HEIGHT: 65 IN | DIASTOLIC BLOOD PRESSURE: 62 MMHG | HEART RATE: 61 BPM | SYSTOLIC BLOOD PRESSURE: 130 MMHG | BODY MASS INDEX: 37.15 KG/M2 | RESPIRATION RATE: 22 BRPM | OXYGEN SATURATION: 98 %

## 2024-05-22 DIAGNOSIS — N39.0 ACUTE UTI: Primary | ICD-10-CM

## 2024-05-22 DIAGNOSIS — E86.0 DEHYDRATION: ICD-10-CM

## 2024-05-22 LAB
ANION GAP SERPL CALCULATED.3IONS-SCNC: 11 MMOL/L (ref 3–16)
BACTERIA URNS QL MICRO: ABNORMAL
BASOPHILS # BLD: 0.01 K/UL (ref 0–0.2)
BASOPHILS NFR BLD: 0 %
BILIRUB UR QL STRIP: NEGATIVE
BNP SERPL-MCNC: 357 PG/ML (ref 0–449)
BUN SERPL-MCNC: 27 MG/DL (ref 7–20)
CALCIUM SERPL-MCNC: 9 MG/DL (ref 8.3–10.6)
CHARACTER UR: ABNORMAL
CHLORIDE SERPL-SCNC: 98 MMOL/L (ref 99–110)
CLARITY UR: CLEAR
CO2 SERPL-SCNC: 23 MMOL/L (ref 21–32)
COLOR UR: YELLOW
CREAT SERPL-MCNC: 1.3 MG/DL (ref 0.6–1.2)
EOSINOPHIL # BLD: 0.12 K/UL (ref 0–0.6)
EOSINOPHILS RELATIVE PERCENT: 2 %
EPI CELLS #/AREA URNS HPF: ABNORMAL /HPF
ERYTHROCYTE [DISTWIDTH] IN BLOOD BY AUTOMATED COUNT: 13.4 % (ref 12.4–15.4)
GFR, ESTIMATED: 43 ML/MIN/1.73M2
GLUCOSE SERPL-MCNC: 113 MG/DL (ref 70–99)
GLUCOSE UR STRIP-MCNC: NEGATIVE MG/DL
HCT VFR BLD AUTO: 33.8 % (ref 36–48)
HGB BLD-MCNC: 11.5 G/DL (ref 12–16)
HGB UR QL STRIP.AUTO: NEGATIVE
IMM GRANULOCYTES # BLD AUTO: 0.02 K/UL (ref 0–0.5)
IMM GRANULOCYTES NFR BLD: 0 %
KETONES UR STRIP-MCNC: NEGATIVE MG/DL
LEUKOCYTE ESTERASE UR QL STRIP: ABNORMAL
LYMPHOCYTES NFR BLD: 2.35 K/UL (ref 1–5.1)
LYMPHOCYTES RELATIVE PERCENT: 35 %
MCH RBC QN AUTO: 29.8 PG (ref 26–34)
MCHC RBC AUTO-ENTMCNC: 34 G/DL (ref 31–36)
MCV RBC AUTO: 87.6 FL (ref 80–100)
MONOCYTES NFR BLD: 0.83 K/UL (ref 0–1.3)
MONOCYTES NFR BLD: 12 %
NEUTROPHILS NFR BLD: 51 %
NEUTS SEG NFR BLD: 3.41 K/UL (ref 1.7–7.7)
NITRITE UR QL STRIP: NEGATIVE
PH UR STRIP: 6 [PH] (ref 5–8)
PLATELET # BLD AUTO: 149 K/UL (ref 135–450)
PMV BLD AUTO: 9.7 FL
POTASSIUM SERPL-SCNC: 5.1 MMOL/L (ref 3.5–5.1)
PROT UR STRIP-MCNC: NEGATIVE MG/DL
RBC # BLD AUTO: 3.86 M/UL (ref 4–5.2)
RBC #/AREA URNS HPF: ABNORMAL /HPF
SODIUM SERPL-SCNC: 132 MMOL/L (ref 136–145)
SP GR UR STRIP: <1.005 (ref 1–1.03)
TROPONIN I SERPL HS-MCNC: 12 NG/L (ref 0–14)
UROBILINOGEN UR STRIP-ACNC: 0.2 EU/DL (ref 0–1)
WBC #/AREA URNS HPF: ABNORMAL /HPF
WBC OTHER # BLD: 6.7 K/UL (ref 4–11)

## 2024-05-22 PROCEDURE — 87077 CULTURE AEROBIC IDENTIFY: CPT

## 2024-05-22 PROCEDURE — 83880 ASSAY OF NATRIURETIC PEPTIDE: CPT

## 2024-05-22 PROCEDURE — 6370000000 HC RX 637 (ALT 250 FOR IP): Performed by: EMERGENCY MEDICINE

## 2024-05-22 PROCEDURE — 71045 X-RAY EXAM CHEST 1 VIEW: CPT

## 2024-05-22 PROCEDURE — 87086 URINE CULTURE/COLONY COUNT: CPT

## 2024-05-22 PROCEDURE — 80048 BASIC METABOLIC PNL TOTAL CA: CPT

## 2024-05-22 PROCEDURE — 99285 EMERGENCY DEPT VISIT HI MDM: CPT

## 2024-05-22 PROCEDURE — 85025 COMPLETE CBC W/AUTO DIFF WBC: CPT

## 2024-05-22 PROCEDURE — 93005 ELECTROCARDIOGRAM TRACING: CPT

## 2024-05-22 PROCEDURE — 84484 ASSAY OF TROPONIN QUANT: CPT

## 2024-05-22 PROCEDURE — 81001 URINALYSIS AUTO W/SCOPE: CPT

## 2024-05-22 RX ORDER — LEVOFLOXACIN 250 MG/1
250 TABLET, FILM COATED ORAL DAILY
Qty: 5 TABLET | Refills: 0 | Status: SHIPPED | OUTPATIENT
Start: 2024-05-23 | End: 2024-05-28

## 2024-05-22 RX ORDER — LEVOFLOXACIN 500 MG/1
250 TABLET, FILM COATED ORAL ONCE
Status: COMPLETED | OUTPATIENT
Start: 2024-05-22 | End: 2024-05-22

## 2024-05-22 RX ADMIN — LEVOFLOXACIN 250 MG: 500 TABLET, FILM COATED ORAL at 17:21

## 2024-05-22 ASSESSMENT — ENCOUNTER SYMPTOMS
ABDOMINAL PAIN: 0
EYES NEGATIVE: 1
GASTROINTESTINAL NEGATIVE: 1
VOMITING: 0
SHORTNESS OF BREATH: 1
NAUSEA: 0

## 2024-05-22 ASSESSMENT — LIFESTYLE VARIABLES
HOW OFTEN DO YOU HAVE A DRINK CONTAINING ALCOHOL: NEVER
HOW MANY STANDARD DRINKS CONTAINING ALCOHOL DO YOU HAVE ON A TYPICAL DAY: PATIENT DOES NOT DRINK

## 2024-05-22 ASSESSMENT — PAIN - FUNCTIONAL ASSESSMENT: PAIN_FUNCTIONAL_ASSESSMENT: NONE - DENIES PAIN

## 2024-05-22 NOTE — ED PROVIDER NOTES
[facility] EMERGENCY DEPT     EMERGENCY DEPARTMENT ENCOUNTER            Pt Name: Erinn Gan   MRN: [id number]   Birthdate 1945   Date of evaluation: 5/22/2024   Provider: Jose Luis Wu MD   PCP: Aroldo Almaguer MD   Note Started: 4:35 PM EDT 5/22/24          CHIEF COMPLAINT     Chief Complaint   Patient presents with    Dizziness             HISTORY OF PRESENT ILLNESS:   History from : Patient   Limitations to history : None     Erinn Gan is a 79 y.o. female who presents ambulatory to the emergency department with a complaint of malaise, fatigue and lightheadedness.  Yesterday she reported she had a day where she was painting inside her house and may have overdone it.  At no time did she feel chest pain.  She has been dealing with some chronic issues related to pedal edema and is on diuretics with some intermittent changes based on what her doctors told her.  She reports no recent illness to include fever, chills, nausea, vomiting, diarrhea and denies any abdominal pain or dysuria.  Patient reports that she has felt this way in the past and usually with rest her condition improves.    Nursing Notes were all reviewed and agreed with, or any disagreements were addressed in the HPI.     Review of Systems   Constitutional:  Positive for fatigue.   HENT: Negative.     Eyes: Negative.    Respiratory:  Positive for shortness of breath.    Cardiovascular:  Positive for leg swelling. Negative for chest pain.   Gastrointestinal: Negative.  Negative for abdominal pain, nausea and vomiting.   Genitourinary: Negative.    Musculoskeletal: Negative.    Neurological:  Positive for dizziness. Negative for speech difficulty, light-headedness and numbness.   Hematological: Negative.    All other systems reviewed and are negative.        MEDICAL HISTORY   has a past medical history of Diastolic dysfunction, Fibromyalgia, Hypertension, and Osteoarthritis.    Past Surgical History:   Procedure  a dose of Levaquin 250 mg p.o. now and a 2-1/2-day outpatient course, urine for culture and primary care follow-up.       Patient was given the following medications:   Medications   levoFLOXacin (LEVAQUIN) tablet 250 mg (has no administration in time range)        CONSULTS:   None   Discussion with Other Professionals: None   {Social Determinants: None   Chronic Conditions:  None    Records Reviewed: EPIC EMR      Disposition Considerations: home self care   I am the Primary Clinician of Record.        FINAL IMPRESSION    1. Acute UTI    2. Dehydration           DISPOSITION/PLAN:     Reviewed ancillary data with patient at bedside  Administer first dose Levaquin 250 mg p.o.  2.5-day outpatient course of antibiotics  Primary care follow-up        Pt discharged home in stable condition.     PATIENT REFERRED TO:   Aroldo Almaguer MD  [address]  [facility] [address] [phone]    Call in 1 day         DISCHARGE MEDICATIONS:   New Prescriptions    LEVOFLOXACIN (LEVAQUIN) 250 MG TABLET    Take 1 tablet by mouth daily for 5 doses        DISCONTINUED MEDICATIONS:   Discontinued Medications    No medications on file              (Please note that portions of this note were completed with a voice recognition program.  Efforts were made to edit the dictations but occasionally words are mis-transcribed.)       Jose Luis Wu MD (electronically signed)              Jose Luis Wu MD  05/22/24 3850

## 2024-05-22 NOTE — ED NOTES
Pt ambulates to restroom without difficulty. RN as standby but pt reports that she is SOB and is having dizziness when ambulating. Urine sample obtained and sent to lab

## 2024-05-22 NOTE — ED TRIAGE NOTES
Pt c/o light-headedness. Pt reports hx of diastolic heart dysfunction. Pt reports she was painting her house yesterday and that is when this feeling started. Pt reports she usually feels better after she lays down, but did not feel better this time with rest. Aox4.

## 2024-05-24 LAB
EKG ATRIAL RATE: 64 BPM
EKG DIAGNOSIS: NORMAL
EKG P AXIS: 18 DEGREES
EKG P-R INTERVAL: 178 MS
EKG Q-T INTERVAL: 406 MS
EKG QRS DURATION: 104 MS
EKG QTC CALCULATION (BAZETT): 418 MS
EKG R AXIS: 21 DEGREES
EKG T AXIS: 29 DEGREES
EKG VENTRICULAR RATE: 64 BPM
MICROORGANISM SPEC CULT: ABNORMAL
SPECIMEN DESCRIPTION: ABNORMAL

## 2024-06-27 ENCOUNTER — OFFICE VISIT (OUTPATIENT)
Dept: CARDIOLOGY CLINIC | Age: 79
End: 2024-06-27
Payer: MEDICARE

## 2024-06-27 VITALS
WEIGHT: 217.6 LBS | SYSTOLIC BLOOD PRESSURE: 136 MMHG | HEART RATE: 71 BPM | BODY MASS INDEX: 36.21 KG/M2 | DIASTOLIC BLOOD PRESSURE: 70 MMHG

## 2024-06-27 DIAGNOSIS — E78.5 HYPERLIPIDEMIA, UNSPECIFIED HYPERLIPIDEMIA TYPE: ICD-10-CM

## 2024-06-27 DIAGNOSIS — R73.09 ELEVATED GLUCOSE: ICD-10-CM

## 2024-06-27 DIAGNOSIS — I20.89 OTHER FORMS OF ANGINA PECTORIS (HCC): ICD-10-CM

## 2024-06-27 DIAGNOSIS — I10 HTN (HYPERTENSION), BENIGN: Primary | ICD-10-CM

## 2024-06-27 DIAGNOSIS — I10 HTN (HYPERTENSION), BENIGN: ICD-10-CM

## 2024-06-27 LAB
ALBUMIN SERPL-MCNC: 4.3 G/DL (ref 3.4–5)
ALBUMIN/GLOB SERPL: 1.7 {RATIO} (ref 1.1–2.2)
ALP SERPL-CCNC: 50 U/L (ref 40–129)
ALT SERPL-CCNC: 21 U/L (ref 10–40)
ANION GAP SERPL CALCULATED.3IONS-SCNC: 9 MMOL/L (ref 3–16)
AST SERPL-CCNC: 19 U/L (ref 15–37)
BASOPHILS # BLD: 0 K/UL (ref 0–0.2)
BASOPHILS NFR BLD: 0.3 %
BILIRUB SERPL-MCNC: <0.2 MG/DL (ref 0–1)
BUN SERPL-MCNC: 20 MG/DL (ref 7–20)
CALCIUM SERPL-MCNC: 9.1 MG/DL (ref 8.3–10.6)
CHLORIDE SERPL-SCNC: 100 MMOL/L (ref 99–110)
CO2 SERPL-SCNC: 25 MMOL/L (ref 21–32)
CREAT SERPL-MCNC: 1.1 MG/DL (ref 0.6–1.2)
DEPRECATED RDW RBC AUTO: 14.7 % (ref 12.4–15.4)
EOSINOPHIL # BLD: 0.2 K/UL (ref 0–0.6)
EOSINOPHIL NFR BLD: 2.4 %
GFR SERPLBLD CREATININE-BSD FMLA CKD-EPI: 52 ML/MIN/{1.73_M2}
GLUCOSE SERPL-MCNC: 121 MG/DL (ref 70–99)
HCT VFR BLD AUTO: 32.8 % (ref 36–48)
HGB BLD-MCNC: 11.1 G/DL (ref 12–16)
LYMPHOCYTES # BLD: 2 K/UL (ref 1–5.1)
LYMPHOCYTES NFR BLD: 30.9 %
MCH RBC QN AUTO: 29.9 PG (ref 26–34)
MCHC RBC AUTO-ENTMCNC: 33.7 G/DL (ref 31–36)
MCV RBC AUTO: 88.8 FL (ref 80–100)
MONOCYTES # BLD: 0.7 K/UL (ref 0–1.3)
MONOCYTES NFR BLD: 10 %
NEUTROPHILS # BLD: 3.7 K/UL (ref 1.7–7.7)
NEUTROPHILS NFR BLD: 56.4 %
PLATELET # BLD AUTO: 155 K/UL (ref 135–450)
PMV BLD AUTO: 8.7 FL (ref 5–10.5)
POTASSIUM SERPL-SCNC: 4.9 MMOL/L (ref 3.5–5.1)
PROT SERPL-MCNC: 6.8 G/DL (ref 6.4–8.2)
RBC # BLD AUTO: 3.69 M/UL (ref 4–5.2)
SODIUM SERPL-SCNC: 134 MMOL/L (ref 136–145)
WBC # BLD AUTO: 6.6 K/UL (ref 4–11)

## 2024-06-27 PROCEDURE — 3078F DIAST BP <80 MM HG: CPT | Performed by: INTERNAL MEDICINE

## 2024-06-27 PROCEDURE — 1123F ACP DISCUSS/DSCN MKR DOCD: CPT | Performed by: INTERNAL MEDICINE

## 2024-06-27 PROCEDURE — 99214 OFFICE O/P EST MOD 30 MIN: CPT | Performed by: INTERNAL MEDICINE

## 2024-06-27 PROCEDURE — 3075F SYST BP GE 130 - 139MM HG: CPT | Performed by: INTERNAL MEDICINE

## 2024-06-27 NOTE — PROGRESS NOTES
[facility]  CARDIOLOGY  OUTPATIENT FOLLOW UP.        Reason for Consultation/Chief Complaint: \"I have been having swelling and HTN.\"       History of Present Illness:  Erinn Gan is a 79 y.o. patient who presented to the hospital with complaints of sob and chest pains.  MPI was negative for ischemia.  Coronary angio was WNL.  Pt is sob.  She may have microvascular angina +/- diastolic dysfunction although echo doesn't show this in 3/22.  Glucose 130-190 over past year but is not treated for Dm and this may contribute to microvascular dysfunction.    4/18/24;  occasional tired feeling in left arm and back.      Past Medical History:   has a past medical history of Diastolic dysfunction, Fibromyalgia, Hypertension, and Osteoarthritis.    Surgical History:   has a past surgical history that includes Cholecystectomy; Breast surgery; Hysterectomy; bladder suspension; Colon surgery (1980); joint replacement; Cervical spine surgery (2012); Upper gastrointestinal endoscopy (N/A, 02/25/2019); Breast cyst excision (Bilateral); and US ASP BREAST CYST LEFT (Left, 02/16/2023).     Social History:   reports that she has never smoked. She has never used smokeless tobacco. She reports that she does not drink alcohol and does not use drugs.     Family History:  No evidence for sudden cardiac death or premature CAD    Home Medications:  Were reviewed and are listed in nursing record. and/or listed below  Prior to Admission medications    Medication Sig Start Date End Date Taking? Authorizing Provider   torsemide (DEMADEX) 10 MG tablet Take 1 tablet by mouth every other day 5/6/24  Yes Jonnathan Mar MD   busPIRone (BUSPAR) 5 MG tablet Take by mouth 2/21/24  Yes Provider, MD Irene   ranolazine (RANEXA) 500 MG extended release tablet TAKE 1 TABLET IN THE MORNING AND 1 TABLET BEFORE BEDTIME 9/21/23  Yes Jonnathan Mar MD   sucralfate (CARAFATE) 1 GM tablet Take 1 tablet by mouth 4 times daily   Yes Provider

## 2024-06-28 LAB
EST. AVERAGE GLUCOSE BLD GHB EST-MCNC: 108.3 MG/DL
HBA1C MFR BLD: 5.4 %

## 2024-08-08 ENCOUNTER — OFFICE VISIT (OUTPATIENT)
Dept: CARDIOLOGY CLINIC | Age: 79
End: 2024-08-08
Payer: MEDICARE

## 2024-08-08 VITALS
SYSTOLIC BLOOD PRESSURE: 124 MMHG | DIASTOLIC BLOOD PRESSURE: 72 MMHG | HEART RATE: 66 BPM | WEIGHT: 214 LBS | BODY MASS INDEX: 35.61 KG/M2

## 2024-08-08 DIAGNOSIS — I10 HTN (HYPERTENSION), BENIGN: ICD-10-CM

## 2024-08-08 DIAGNOSIS — E78.5 HYPERLIPIDEMIA, UNSPECIFIED HYPERLIPIDEMIA TYPE: Primary | ICD-10-CM

## 2024-08-08 DIAGNOSIS — R60.9 EDEMA, UNSPECIFIED TYPE: ICD-10-CM

## 2024-08-08 PROCEDURE — 99214 OFFICE O/P EST MOD 30 MIN: CPT | Performed by: INTERNAL MEDICINE

## 2024-08-08 PROCEDURE — 3074F SYST BP LT 130 MM HG: CPT | Performed by: INTERNAL MEDICINE

## 2024-08-08 PROCEDURE — 1123F ACP DISCUSS/DSCN MKR DOCD: CPT | Performed by: INTERNAL MEDICINE

## 2024-08-08 PROCEDURE — 3078F DIAST BP <80 MM HG: CPT | Performed by: INTERNAL MEDICINE

## 2024-08-08 RX ORDER — TORSEMIDE 10 MG/1
10 TABLET ORAL DAILY
Qty: 90 TABLET | Refills: 3 | Status: SHIPPED | OUTPATIENT
Start: 2024-08-08

## 2024-08-08 NOTE — PROGRESS NOTES
[facility]  CARDIOLOGY  OUTPATIENT FOLLOW UP.        Reason for Consultation/Chief Complaint: \"I have been having swelling and HTN.\"       History of Present Illness:  Erinn Gan is a 79 y.o. patient who presented to the hospital with complaints of sob and chest pains.  MPI was negative for ischemia.  Coronary angio was WNL.  Pt is sob.  She may have microvascular angina +/- diastolic dysfunction although echo doesn't show this in 3/22.  Glucose 130-190 over past year but is not treated for Dm and this may contribute to microvascular dysfunction.    4/18/24;  occasional tired feeling in left arm and back.      Past Medical History:   has a past medical history of Diastolic dysfunction, Fibromyalgia, Hypertension, and Osteoarthritis.    Surgical History:   has a past surgical history that includes Cholecystectomy; Breast surgery; Hysterectomy; bladder suspension; Colon surgery (1980); joint replacement; Cervical spine surgery (2012); Upper gastrointestinal endoscopy (N/A, 02/25/2019); Breast cyst excision (Bilateral); and US ASP BREAST CYST LEFT (Left, 02/16/2023).     Social History:   reports that she has never smoked. She has never used smokeless tobacco. She reports that she does not drink alcohol and does not use drugs.     Family History:  No evidence for sudden cardiac death or premature CAD    Home Medications:  Were reviewed and are listed in nursing record. and/or listed below  Prior to Admission medications    Medication Sig Start Date End Date Taking? Authorizing Provider   torsemide (DEMADEX) 10 MG tablet Take 1 tablet by mouth every other day 5/6/24  Yes Jonnathan Mar MD   busPIRone (BUSPAR) 5 MG tablet Take by mouth 2/21/24  Yes Provider, MD Irene   ranolazine (RANEXA) 500 MG extended release tablet TAKE 1 TABLET IN THE MORNING AND 1 TABLET BEFORE BEDTIME 9/21/23  Yes Jonnathan Mar MD   sucralfate (CARAFATE) 1 GM tablet Take 1 tablet by mouth 4 times daily   Yes Provider

## 2025-02-03 ENCOUNTER — HOSPITAL ENCOUNTER (OUTPATIENT)
Dept: MAMMOGRAPHY | Age: 80
Discharge: HOME | End: 2025-02-03
Payer: MEDICARE

## 2025-02-03 VITALS — HEIGHT: 65 IN | BODY MASS INDEX: 34.99 KG/M2 | WEIGHT: 210 LBS

## 2025-02-03 DIAGNOSIS — R93.89 ABNORMAL FINDING ON IMAGING: ICD-10-CM

## 2025-02-03 PROCEDURE — 77066 DX MAMMO INCL CAD BI: CPT

## 2025-02-03 PROCEDURE — G0279 TOMOSYNTHESIS, MAMMO: HCPCS

## 2025-02-25 ENCOUNTER — OFFICE VISIT (OUTPATIENT)
Dept: UROGYNECOLOGY | Age: 80
End: 2025-02-25
Payer: MEDICARE

## 2025-02-25 VITALS
DIASTOLIC BLOOD PRESSURE: 71 MMHG | SYSTOLIC BLOOD PRESSURE: 120 MMHG | HEART RATE: 88 BPM | TEMPERATURE: 98.1 F | RESPIRATION RATE: 16 BRPM | OXYGEN SATURATION: 91 %

## 2025-02-25 DIAGNOSIS — N81.12 CYSTOCELE, LATERAL: Primary | ICD-10-CM

## 2025-02-25 DIAGNOSIS — N95.2 VAGINAL ATROPHY: ICD-10-CM

## 2025-02-25 DIAGNOSIS — R39.15 URINARY URGENCY: ICD-10-CM

## 2025-02-25 DIAGNOSIS — K59.00 CONSTIPATION, UNSPECIFIED CONSTIPATION TYPE: ICD-10-CM

## 2025-02-25 DIAGNOSIS — N81.6 RECTOCELE: ICD-10-CM

## 2025-02-25 LAB
BILIRUBIN, POC: NORMAL
BLOOD URINE, POC: NORMAL
CLARITY, POC: CLEAR
COLOR, POC: YELLOW
EMPTY COUGH STRESS TEST: NORMAL
FIRST SENSATION: 70 CC
FULL COUGH STRESS TEST: POSITIVE
GLUCOSE URINE, POC: NORMAL MG/DL
KETONES, POC: NORMAL MG/DL
LEUKOCYTE EST, POC: NORMAL
MAX SENSATION: 340 CC
NITRATE, URINE POC: NORMAL
NITRITE, POC: NORMAL
PH, POC: 5.5
POST VOID RESIDUAL (PVR): 200 ML
PROTEIN, POC: NORMAL MG/DL
RBC URINE, POC: NORMAL
SECOND SENSATION: 250 CC
SPASM: NORMAL
SPECIFIC GRAVITY, POC: 1.01
UROBILINOGEN, POC: NORMAL MG/DL
WBC URINE, POC: NORMAL

## 2025-02-25 PROCEDURE — 81002 URINALYSIS NONAUTO W/O SCOPE: CPT | Performed by: OBSTETRICS & GYNECOLOGY

## 2025-02-25 PROCEDURE — 51725 SIMPLE CYSTOMETROGRAM: CPT | Performed by: OBSTETRICS & GYNECOLOGY

## 2025-02-25 PROCEDURE — 99204 OFFICE O/P NEW MOD 45 MIN: CPT | Performed by: OBSTETRICS & GYNECOLOGY

## 2025-02-25 ASSESSMENT — ENCOUNTER SYMPTOMS
RECTAL PAIN: 1
CONSTIPATION: 1
EYE ITCHING: 1
ABDOMINAL PAIN: 1
ABDOMINAL DISTENTION: 1
BACK PAIN: 1

## 2025-02-25 NOTE — PROGRESS NOTES
2/25/2025      HPI:     Name: Erinn Gan  YOB: 1945    CC: Patient is a 79 y.o. female who is seen in consultation from Alexei Jara MD   for evaluation of incontinence, prolapse.     HPI:  Coy Echeverria, DO - 02/04/2025  Formatting of this note might be different from the original.  DEFECOGRAPHY RETROGRADE BARIUM ENEMA PROCEDURE AND INTERPRETATION on 2/4/2025    HISTORY: Chronic constipation, 79-year-old female with previous partial hysterectomy and chronic complaints of constipation    PROCEDURE:    With the patient in the left lateral decubitus position, barium was instilled in retrograde fashion into the rectum. Patient was then placed upright on a radiolucent commode and instructed to relax initially, Valsalva maneuver and subsequently evacuate the contents of the rectum. Oral contrast was also ingested during prior to procedure to evaluate for an enterocele. The patient tolerated the procedure without difficulty.    FINDINGS:    The  view after drinking contrast demonstrates normal transit through the stomach into the small bowel and into the terminal ileum region with moderate stool in the proximal colon    The anorectal angle was approximately 145 degrees before a valsalva maneuver. During Valsalva maneuver and bearing down, the anorectal angle became 148 degrees.    A small anterior rectocele presented during Valsalva maneuver measuring at least 1.2 x 4.2 cm.    Patient did not have difficulty evacuating the barium. During evacuation, there is noted to be fairly complete emptying of the rectum with an abrupt angle of the rectum in front of the inferior sacrum.    No sign of any enterocele.      IMPRESSION:    1. 1.2 x 4.2 cm anterior rectocele is identified during defecation.    2. Fairly complete emptying of the rectum with incidental note made of an abrupt angle between the sigmoid colon and rectum in the presacral region or upper rectum.    3. No sign of any small bowel

## 2025-03-06 ENCOUNTER — TELEPHONE (OUTPATIENT)
Dept: UROGYNECOLOGY | Age: 80
End: 2025-03-06

## 2025-03-06 NOTE — TELEPHONE ENCOUNTER
Spoke with patient who stated that Dr. Jara referred her to PFPT. She did not know if Dr. Jara should send records over or if we do. Informed her that since Dr. Jara referred her, then she should call his office for the provision of clinical documentation. Phone number provided, and patient verbalized understanding to all instructions, no further needs at this time. Electronically signed by Xi Cardenas RN on 3/6/2025 at 12:33 PM

## 2025-04-14 ENCOUNTER — OFFICE VISIT (OUTPATIENT)
Dept: UROGYNECOLOGY | Age: 80
End: 2025-04-14
Payer: MEDICARE

## 2025-04-14 VITALS
HEART RATE: 82 BPM | RESPIRATION RATE: 16 BRPM | SYSTOLIC BLOOD PRESSURE: 110 MMHG | OXYGEN SATURATION: 96 % | TEMPERATURE: 98.2 F | DIASTOLIC BLOOD PRESSURE: 70 MMHG

## 2025-04-14 DIAGNOSIS — K59.00 CONSTIPATION, UNSPECIFIED CONSTIPATION TYPE: ICD-10-CM

## 2025-04-14 DIAGNOSIS — N81.12 CYSTOCELE, LATERAL: Primary | ICD-10-CM

## 2025-04-14 DIAGNOSIS — N81.6 RECTOCELE: ICD-10-CM

## 2025-04-14 DIAGNOSIS — R39.15 URINARY URGENCY: ICD-10-CM

## 2025-04-14 PROCEDURE — 1159F MED LIST DOCD IN RCRD: CPT | Performed by: NURSE PRACTITIONER

## 2025-04-14 PROCEDURE — 1160F RVW MEDS BY RX/DR IN RCRD: CPT | Performed by: NURSE PRACTITIONER

## 2025-04-14 PROCEDURE — 1123F ACP DISCUSS/DSCN MKR DOCD: CPT | Performed by: NURSE PRACTITIONER

## 2025-04-14 PROCEDURE — 99213 OFFICE O/P EST LOW 20 MIN: CPT | Performed by: NURSE PRACTITIONER

## 2025-04-14 NOTE — PROGRESS NOTES
2025       HPI:     Name: Erinn Gan  YOB: 1945    CC: Patient is a 80 y.o. presenting for evaluation of rectocele, cystocele, urinary urgency and frequency.       HPI:   Patient previously referred to PFPT by Dr. Jara - patient has been going to PFPT through [place].     How long have you had this problem?    Please rate the severity of your problem:   Anything make it better?     She is working on fiber intake and PT for her bowels.  Some days are good, others are not    She continues to feel fullness and pressure of bladder with episodes of urgency  Has tried OAB meds in past.      Ob/Gyn History:    OB History    Para Term  AB Living   3 2 2  1    SAB IAB Ectopic Molar Multiple Live Births   1           # Outcome Date GA Lbr Tye/2nd Weight Sex Type Anes PTL Lv   3 SAB         DEC   2 Term            1 Term    3.912 kg (8 lb 10 oz)          Past Medical History:   Past Medical History:   Diagnosis Date    Diastolic dysfunction     Fibromyalgia     Hypertension     Osteoarthritis      Past Surgical History:   Past Surgical History:   Procedure Laterality Date    BLADDER SUSPENSION      BREAST BIOPSY Left 2024    benign core biopsy    BREAST CYST EXCISION Bilateral     multiple bx, all benign, fibrocystic disease    BREAST SURGERY      CERVICAL SPINE SURGERY      fusion     CHOLECYSTECTOMY      COLON SURGERY  1980    resection    HYSTERECTOMY (CERVIX STATUS UNKNOWN)      JOINT REPLACEMENT      Knee 2013 right; knee 2015 left knee    UPPER GASTROINTESTINAL ENDOSCOPY N/A 2019    EGD BIOPSY performed by Cruz DELGADILLO MD at [facility] ENDOSCOPY    US ASP BREAST CYST LEFT Left 2023    US ASP BREAST CYST LEFT 2023 Kenzie Frausto MD [facility] MOB ULTRASOUND     Allergies:   Allergies   Allergen Reactions    Alendronate      Other reaction(s): GI Upset  Pt had severe gi distress.    Atorvastatin Other (See Comments)     Other reaction(s): Muscle Aches    Cefdinir

## 2025-05-07 ENCOUNTER — OFFICE VISIT (OUTPATIENT)
Dept: UROGYNECOLOGY | Age: 80
End: 2025-05-07
Payer: MEDICARE

## 2025-05-07 VITALS
SYSTOLIC BLOOD PRESSURE: 122 MMHG | RESPIRATION RATE: 16 BRPM | TEMPERATURE: 97.4 F | DIASTOLIC BLOOD PRESSURE: 78 MMHG | OXYGEN SATURATION: 99 % | HEART RATE: 69 BPM

## 2025-05-07 DIAGNOSIS — N81.6 RECTOCELE: ICD-10-CM

## 2025-05-07 DIAGNOSIS — N95.2 VAGINAL ATROPHY: ICD-10-CM

## 2025-05-07 DIAGNOSIS — R39.15 URINARY URGENCY: ICD-10-CM

## 2025-05-07 DIAGNOSIS — N81.12 CYSTOCELE, LATERAL: Primary | ICD-10-CM

## 2025-05-07 PROCEDURE — 99212 OFFICE O/P EST SF 10 MIN: CPT | Performed by: NURSE PRACTITIONER

## 2025-05-07 PROCEDURE — 1159F MED LIST DOCD IN RCRD: CPT | Performed by: NURSE PRACTITIONER

## 2025-05-07 PROCEDURE — 1123F ACP DISCUSS/DSCN MKR DOCD: CPT | Performed by: NURSE PRACTITIONER

## 2025-05-07 NOTE — PROGRESS NOTES
Date: 2025     HPI:     Name: Erinn Gan  YOB: 1945    CC: Erinn Gan returns for a pessary check today.  HPI: She reports no bleeding, no discharge, no pain, no discomfot. She is not removing the device herself at home.   Do you have vaginal discharge?: No  How long have you had this problem?:   months  Please rate the severity of your problem: mild  Anything make it better? Per patient she has seen improvement with pessary - still having frequency, but does take a water pill. Patient also noticing since pessary was in place she is having some minor leakage.    This minor leak is more tolerable than bladder symptoms of U/F prior to pessary use     Ob/Gyn History:    OB History    Para Term  AB Living   3 2 2  1    SAB IAB Ectopic Molar Multiple Live Births   1           # Outcome Date GA Lbr Tye/2nd Weight Sex Type Anes PTL Lv   3 SAB         DEC   2 Term            1 Term    3.912 kg (8 lb 10 oz)           Past Medical History:   Past Medical History:   Diagnosis Date    Diastolic dysfunction     Fibromyalgia     Hypertension     Osteoarthritis      Past Surgical History:   Past Surgical History:   Procedure Laterality Date    BLADDER SUSPENSION      BREAST BIOPSY Left 2024    benign core biopsy    BREAST CYST EXCISION Bilateral     multiple bx, all benign, fibrocystic disease    BREAST SURGERY      CERVICAL SPINE SURGERY      fusion     CHOLECYSTECTOMY      COLON SURGERY  1980    resection    HYSTERECTOMY (CERVIX STATUS UNKNOWN)      JOINT REPLACEMENT      Knee 2013 right; knee  left knee    UPPER GASTROINTESTINAL ENDOSCOPY N/A 2019    EGD BIOPSY performed by Cruz DELGADILLO MD at [facility] ENDOSCOPY    US ASP BREAST CYST LEFT Left 2023    US ASP BREAST CYST LEFT 2023 Kenzie Frausto MD [facility] MOB ULTRASOUND     Current Medications:  Current Outpatient Medications   Medication Sig Dispense Refill    torsemide (DEMADEX) 10 MG tablet Take 1 tablet

## 2025-05-18 PROCEDURE — 4500000002 HC ER NO CHARGE

## 2025-05-18 RX ORDER — CEFUROXIME AXETIL 250 MG/1
250 TABLET ORAL 2 TIMES DAILY
COMMUNITY
Start: 2025-05-12 | End: 2025-05-19

## 2025-05-18 ASSESSMENT — PAIN SCALES - GENERAL: PAINLEVEL_OUTOF10: 0

## 2025-05-18 ASSESSMENT — PAIN - FUNCTIONAL ASSESSMENT: PAIN_FUNCTIONAL_ASSESSMENT: 0-10

## 2025-05-19 ENCOUNTER — HOSPITAL ENCOUNTER (EMERGENCY)
Age: 80
Discharge: LEFT BEFORE DISCHARGE | End: 2025-05-19
Attending: EMERGENCY MEDICINE
Payer: MEDICARE

## 2025-05-19 ENCOUNTER — APPOINTMENT (OUTPATIENT)
Age: 80
End: 2025-05-19
Payer: MEDICARE

## 2025-05-19 VITALS
SYSTOLIC BLOOD PRESSURE: 132 MMHG | OXYGEN SATURATION: 96 % | WEIGHT: 214 LBS | BODY MASS INDEX: 36.54 KG/M2 | HEART RATE: 67 BPM | HEIGHT: 64 IN | RESPIRATION RATE: 16 BRPM | DIASTOLIC BLOOD PRESSURE: 65 MMHG | TEMPERATURE: 97.5 F

## 2025-05-19 DIAGNOSIS — Z53.21 PATIENT LEFT AFTER TRIAGE: ICD-10-CM

## 2025-05-19 DIAGNOSIS — R22.42 LUMP OF SKIN OF LEFT LOWER EXTREMITY: Primary | ICD-10-CM

## 2025-05-19 PROCEDURE — 73610 X-RAY EXAM OF ANKLE: CPT

## 2025-05-19 NOTE — ED TRIAGE NOTES
Pt states she has been working in her yard for 2 days, tonight she noted a lump on the inner aspect of her left ankle, states lump is not painful, does not know what caused lump. No redness noted, no calf pain with dorsiflexion of foot. Pt states she feels area is discolored. No discoloration noted. Firm area 3 cm in diameter palpated, no other abnormalities noted.

## 2025-05-19 NOTE — ED PROVIDER NOTES
Patient left the emergency department without being seen after RN triage.  I did not perform a face-to-face evaluation on this patient.     Yumiko Carlos, DO  05/19/25 0451

## 2025-06-27 ENCOUNTER — OFFICE VISIT (OUTPATIENT)
Dept: UROGYNECOLOGY | Age: 80
End: 2025-06-27

## 2025-06-27 DIAGNOSIS — N39.0 RECURRENT UTI: Primary | ICD-10-CM

## 2025-06-27 LAB
BILIRUBIN, POC: NORMAL
BLOOD URINE, POC: NORMAL
CLARITY, POC: NORMAL
COLOR, POC: NORMAL
GLUCOSE URINE, POC: NORMAL
KETONES, POC: NORMAL
LEUKOCYTE EST, POC: NORMAL
NITRITE, POC: NORMAL
PH, POC: NORMAL
PROTEIN, POC: NORMAL
SPECIFIC GRAVITY, POC: NORMAL
UROBILINOGEN, POC: NORMAL

## 2025-06-27 RX ORDER — ESTRADIOL 0.1 MG/G
0.25 CREAM VAGINAL DAILY
Qty: 30 G | Refills: 1 | Status: SHIPPED | OUTPATIENT
Start: 2025-06-27

## 2025-06-27 NOTE — PROGRESS NOTES
Date: 2025     HPI:     Name: Erinn Gan  YOB: 1945    CC: Erinn Gan returns for a pessary check today.    HPI: She reports no bleeding, no discharge, no pain, no discomfot. She is not removing the device herself at home.   Do you have vaginal discharge?: No  How long have you had this problem?:     Please rate the severity of your problem: moderate  Anything make it better? Patient states she is having pain, burning, and urination. She states that she has had 3 UTI's back to back. Finished a course of Ceftin on Tuesday.     25: Ecoli:  I-tetracycline, levofloxacin, cipro  25: Ecoli/enterococcus: Pan sensitive  25: Ecoli/enterococcus: pan sensitive       Ob/Gyn History:    OB History    Para Term  AB Living   3 2 2  1    SAB IAB Ectopic Molar Multiple Live Births   1           # Outcome Date GA Lbr Tye/2nd Weight Sex Type Anes PTL Lv   3 SAB         DEC   2 Term            1 Term    3.912 kg (8 lb 10 oz)           Past Medical History:   Past Medical History:   Diagnosis Date    Diastolic dysfunction     Fibromyalgia     Hypertension     Osteoarthritis      Past Surgical History:   Past Surgical History:   Procedure Laterality Date    BLADDER SUSPENSION      BREAST BIOPSY Left 2024    benign core biopsy    BREAST CYST EXCISION Bilateral     multiple bx, all benign, fibrocystic disease    BREAST SURGERY      CERVICAL SPINE SURGERY      fusion     CHOLECYSTECTOMY      COLON SURGERY  1980    resection    HYSTERECTOMY (CERVIX STATUS UNKNOWN)      JOINT REPLACEMENT      Knee 2013 right; knee  left knee    UPPER GASTROINTESTINAL ENDOSCOPY N/A 2019    EGD BIOPSY performed by Cruz DELGADILLO MD at [facility] ENDOSCOPY    US ASP BREAST CYST LEFT Left 2023    US ASP BREAST CYST LEFT 2023 Kenzie Frausto MD [facility] MOB ULTRASOUND     Current Medications:  Current Outpatient Medications   Medication Sig Dispense Refill    estradiol (ESTRACE

## 2025-06-29 LAB
BACTERIA UR CULT: ABNORMAL
ORGANISM: ABNORMAL

## 2025-06-30 ENCOUNTER — RESULTS FOLLOW-UP (OUTPATIENT)
Dept: UROGYNECOLOGY | Age: 80
End: 2025-06-30

## 2025-06-30 DIAGNOSIS — N39.0 ACUTE UTI: Primary | ICD-10-CM

## 2025-06-30 LAB
BACTERIA UR CULT: ABNORMAL
ORGANISM: ABNORMAL

## 2025-06-30 RX ORDER — NITROFURANTOIN 25; 75 MG/1; MG/1
100 CAPSULE ORAL 2 TIMES DAILY
Qty: 10 CAPSULE | Refills: 0 | Status: SHIPPED | OUTPATIENT
Start: 2025-06-30 | End: 2025-07-05

## 2025-07-07 ENCOUNTER — TELEPHONE (OUTPATIENT)
Dept: UROGYNECOLOGY | Age: 80
End: 2025-07-07

## 2025-07-07 DIAGNOSIS — N39.0 RECURRENT UTI: ICD-10-CM

## 2025-07-07 DIAGNOSIS — R39.15 URINARY URGENCY: Primary | ICD-10-CM

## 2025-07-07 DIAGNOSIS — R39.15 URINARY URGENCY: ICD-10-CM

## 2025-07-07 NOTE — TELEPHONE ENCOUNTER
Patient has generalized malaise and concerns for anemia - I informed her that she should visit her PCP if she believes she is anemic, but we can send an outpatient urine culture KARINA to confirm UTI is not cause of this. Outpatient Urine culture orders sent per protocol.     Denies pain, burning with urination, u/f, or other s/s of UTI. Patient verbalizes understanding of all of the above, and has no further questions or concerns at this time. Encouraged to call back the office should any questions/concerns arise. 7/7/2025 at 9:39 AM.

## 2025-07-08 LAB — BACTERIA UR CULT: NORMAL

## 2025-07-19 ENCOUNTER — APPOINTMENT (OUTPATIENT)
Age: 80
End: 2025-07-19
Payer: MEDICARE

## 2025-07-19 ENCOUNTER — HOSPITAL ENCOUNTER (EMERGENCY)
Age: 80
Discharge: HOME | End: 2025-07-19
Attending: EMERGENCY MEDICINE
Payer: MEDICARE

## 2025-07-19 VITALS
DIASTOLIC BLOOD PRESSURE: 81 MMHG | HEART RATE: 65 BPM | WEIGHT: 224.21 LBS | HEIGHT: 65 IN | RESPIRATION RATE: 16 BRPM | OXYGEN SATURATION: 95 % | SYSTOLIC BLOOD PRESSURE: 126 MMHG | BODY MASS INDEX: 37.36 KG/M2 | TEMPERATURE: 97.7 F

## 2025-07-19 DIAGNOSIS — N39.0 URINARY TRACT INFECTION WITHOUT HEMATURIA, SITE UNSPECIFIED: Primary | ICD-10-CM

## 2025-07-19 LAB
ALBUMIN SERPL-MCNC: 3.8 G/DL (ref 3.4–5)
ALBUMIN/GLOB SERPL: 1.4 {RATIO}
ALP SERPL-CCNC: 56 U/L (ref 40–129)
ALT SERPL-CCNC: 26 U/L (ref 10–40)
ANION GAP SERPL CALCULATED.3IONS-SCNC: 8 MMOL/L (ref 3–16)
AST SERPL-CCNC: 21 U/L (ref 15–37)
BACTERIA URNS QL MICRO: ABNORMAL
BASOPHILS # BLD: 0.01 K/UL (ref 0–0.2)
BASOPHILS NFR BLD: 0 %
BILIRUB SERPL-MCNC: <0.2 MG/DL (ref 0–1)
BILIRUB UR QL STRIP: NEGATIVE
BUN SERPL-MCNC: 14 MG/DL (ref 7–20)
CALCIUM SERPL-MCNC: 9.1 MG/DL (ref 8.3–10.6)
CHARACTER UR: ABNORMAL
CHLORIDE SERPL-SCNC: 107 MMOL/L (ref 99–110)
CLARITY UR: CLEAR
CO2 SERPL-SCNC: 25 MMOL/L (ref 21–32)
COLOR UR: YELLOW
CREAT SERPL-MCNC: 1 MG/DL (ref 0.6–1.2)
EOSINOPHIL # BLD: 0.15 K/UL (ref 0–0.6)
EOSINOPHILS RELATIVE PERCENT: 3 %
EPI CELLS #/AREA URNS HPF: ABNORMAL /HPF
ERYTHROCYTE [DISTWIDTH] IN BLOOD BY AUTOMATED COUNT: 14 % (ref 12.4–15.4)
GFR, ESTIMATED: 58 ML/MIN/1.73M2
GLUCOSE SERPL-MCNC: 139 MG/DL (ref 70–99)
GLUCOSE UR STRIP-MCNC: 250 MG/DL
HCT VFR BLD AUTO: 30.8 % (ref 36–48)
HGB BLD-MCNC: 9.8 G/DL (ref 12–16)
HGB UR QL STRIP.AUTO: ABNORMAL
IMM GRANULOCYTES # BLD AUTO: 0.02 K/UL (ref 0–0.5)
IMM GRANULOCYTES NFR BLD: 0 %
KETONES UR STRIP-MCNC: NEGATIVE MG/DL
LEUKOCYTE ESTERASE UR QL STRIP: ABNORMAL
LYMPHOCYTES NFR BLD: 1.53 K/UL (ref 1–5.1)
LYMPHOCYTES RELATIVE PERCENT: 31 %
MCH RBC QN AUTO: 28.5 PG (ref 26–34)
MCHC RBC AUTO-ENTMCNC: 31.8 G/DL (ref 31–36)
MCV RBC AUTO: 89.5 FL (ref 80–100)
MONOCYTES NFR BLD: 0.49 K/UL (ref 0–1.3)
MONOCYTES NFR BLD: 10 %
NEUTROPHILS NFR BLD: 55 %
NEUTS SEG NFR BLD: 2.71 K/UL (ref 1.7–7.7)
NITRITE UR QL STRIP: POSITIVE
PH UR STRIP: 6 [PH] (ref 5–8)
PLATELET # BLD AUTO: 116 K/UL (ref 135–450)
PMV BLD AUTO: 9.4 FL (ref 9.4–12.4)
POTASSIUM SERPL-SCNC: 4.5 MMOL/L (ref 3.5–5.1)
PROT SERPL-MCNC: 6.4 G/DL (ref 6.4–8.2)
PROT UR STRIP-MCNC: 100 MG/DL
RBC # BLD AUTO: 3.44 M/UL (ref 4–5.2)
RBC #/AREA URNS HPF: ABNORMAL /HPF
SODIUM SERPL-SCNC: 140 MMOL/L (ref 136–145)
SP GR UR STRIP: 1.02 (ref 1–1.03)
UROBILINOGEN UR STRIP-ACNC: 4 EU/DL (ref 0–1)
WBC #/AREA URNS HPF: ABNORMAL /HPF
WBC OTHER # BLD: 4.9 K/UL (ref 4–11)

## 2025-07-19 PROCEDURE — 80053 COMPREHEN METABOLIC PANEL: CPT

## 2025-07-19 PROCEDURE — 99284 EMERGENCY DEPT VISIT MOD MDM: CPT

## 2025-07-19 PROCEDURE — 85025 COMPLETE CBC W/AUTO DIFF WBC: CPT

## 2025-07-19 PROCEDURE — 81001 URINALYSIS AUTO W/SCOPE: CPT

## 2025-07-19 PROCEDURE — 51798 US URINE CAPACITY MEASURE: CPT

## 2025-07-19 PROCEDURE — 87086 URINE CULTURE/COLONY COUNT: CPT

## 2025-07-19 PROCEDURE — 6370000000 HC RX 637 (ALT 250 FOR IP): Performed by: EMERGENCY MEDICINE

## 2025-07-19 PROCEDURE — 74176 CT ABD & PELVIS W/O CONTRAST: CPT

## 2025-07-19 RX ORDER — CIPROFLOXACIN 500 MG/1
500 TABLET, FILM COATED ORAL 2 TIMES DAILY
Qty: 14 TABLET | Refills: 0 | Status: SHIPPED | OUTPATIENT
Start: 2025-07-19 | End: 2025-07-26

## 2025-07-19 RX ORDER — CIPROFLOXACIN 500 MG/1
500 TABLET, FILM COATED ORAL ONCE
Status: COMPLETED | OUTPATIENT
Start: 2025-07-19 | End: 2025-07-19

## 2025-07-19 RX ADMIN — CIPROFLOXACIN HYDROCHLORIDE 500 MG: 500 TABLET, FILM COATED ORAL at 11:58

## 2025-07-19 ASSESSMENT — PAIN DESCRIPTION - LOCATION
LOCATION: FLANK
LOCATION: FLANK

## 2025-07-19 ASSESSMENT — PAIN DESCRIPTION - ORIENTATION
ORIENTATION: RIGHT
ORIENTATION: RIGHT

## 2025-07-19 ASSESSMENT — PAIN SCALES - GENERAL
PAINLEVEL_OUTOF10: 6
PAINLEVEL_OUTOF10: 6

## 2025-07-19 ASSESSMENT — PAIN DESCRIPTION - PAIN TYPE
TYPE: ACUTE PAIN
TYPE: ACUTE PAIN

## 2025-07-19 ASSESSMENT — PAIN DESCRIPTION - DESCRIPTORS
DESCRIPTORS: DISCOMFORT
DESCRIPTORS: DISCOMFORT

## 2025-07-19 ASSESSMENT — PAIN - FUNCTIONAL ASSESSMENT
PAIN_FUNCTIONAL_ASSESSMENT: ACTIVITIES ARE NOT PREVENTED
PAIN_FUNCTIONAL_ASSESSMENT: ACTIVITIES ARE NOT PREVENTED
PAIN_FUNCTIONAL_ASSESSMENT: 0-10
PAIN_FUNCTIONAL_ASSESSMENT: 0-10

## 2025-07-19 NOTE — ED PROVIDER NOTES
[facility] EMERGENCY DEPARTMENT    CHIEF COMPLAINT  Frequent/Recurrent UTI (Patient reports 5 UTI over 2 months also growing e coli. ) and Flank Pain (Right side.)       HISTORY OF PRESENT ILLNESS  Erinn Gan is a 80 y.o. female who presents to the ED with dysuria, urgency, frequency.  Symptoms of going on for the past 3 days.  States she has had frequent UTIs over the past several months with most recent having E. coli positive culture.  She has had antibiotics several times.  Over the last day she is developed pain in her right flank.  Reports nausea and fatigue but denies vomiting, diarrhea, constipation or fever.  Denies hematuria.  States she might of had a kidney stone in the past.  States she has a pessary and follows with urogynecology.  Does not take topical estrogen due to making her skin burn.     I have reviewed the following from the nursing documentation:    Past Medical History:   Diagnosis Date    Diastolic dysfunction     Fibromyalgia     Hypertension     Osteoarthritis      Past Surgical History:   Procedure Laterality Date    BLADDER SUSPENSION      BREAST BIOPSY Left 08/2024    benign core biopsy    BREAST CYST EXCISION Bilateral     multiple bx, all benign, fibrocystic disease    BREAST SURGERY      CERVICAL SPINE SURGERY  2012    fusion     CHOLECYSTECTOMY      COLON SURGERY  1980    resection    HYSTERECTOMY (CERVIX STATUS UNKNOWN)      JOINT REPLACEMENT      Knee 2013 right; knee 2015 left knee    UPPER GASTROINTESTINAL ENDOSCOPY N/A 02/25/2019    EGD BIOPSY performed by Cruz DELGADILLO MD at [facility] ENDOSCOPY    US ASP BREAST CYST LEFT Left 02/16/2023    US ASP BREAST CYST LEFT 2/16/2023 Kenzie Frausto MD [facility] MOB ULTRASOUND     Family History   Problem Relation Age of Onset    Hypertension Mother     Diabetes Mother     Breast Cancer Mother     Cancer Father         liver    Hypertension Sister     Cancer Sister         rectum and rib    Breast Cancer Daughter     BRCA 2  software. Efforts were made by me to ensure accuracy, however some errors may be present due to limitations of this technology and occasionally words are not transcribed correctly.       Amy Tidwell MD  07/19/25 7023

## 2025-07-20 LAB
MICROORGANISM SPEC CULT: NORMAL
SPECIMEN DESCRIPTION: NORMAL

## 2025-07-28 ENCOUNTER — OFFICE VISIT (OUTPATIENT)
Dept: UROGYNECOLOGY | Age: 80
End: 2025-07-28
Payer: MEDICARE

## 2025-07-28 VITALS
DIASTOLIC BLOOD PRESSURE: 68 MMHG | OXYGEN SATURATION: 98 % | SYSTOLIC BLOOD PRESSURE: 116 MMHG | RESPIRATION RATE: 16 BRPM | TEMPERATURE: 97.2 F | HEART RATE: 70 BPM

## 2025-07-28 DIAGNOSIS — N95.2 VAGINAL ATROPHY: ICD-10-CM

## 2025-07-28 DIAGNOSIS — R35.0 URINARY FREQUENCY: ICD-10-CM

## 2025-07-28 DIAGNOSIS — R39.15 URINARY URGENCY: ICD-10-CM

## 2025-07-28 DIAGNOSIS — R30.0 DYSURIA: Primary | ICD-10-CM

## 2025-07-28 LAB
BILIRUBIN, POC: NORMAL
BLOOD URINE, POC: NORMAL
CLARITY, POC: CLEAR
COLOR, POC: YELLOW
GLUCOSE URINE, POC: NORMAL MG/DL
KETONES, POC: NORMAL MG/DL
LEUKOCYTE EST, POC: NORMAL
NITRITE, POC: NORMAL
PH, POC: 5
PROTEIN, POC: NORMAL MG/DL
SPECIFIC GRAVITY, POC: 1.01
UROBILINOGEN, POC: NORMAL MG/DL

## 2025-07-28 PROCEDURE — 1160F RVW MEDS BY RX/DR IN RCRD: CPT | Performed by: NURSE PRACTITIONER

## 2025-07-28 PROCEDURE — 81002 URINALYSIS NONAUTO W/O SCOPE: CPT | Performed by: NURSE PRACTITIONER

## 2025-07-28 PROCEDURE — 99212 OFFICE O/P EST SF 10 MIN: CPT | Performed by: NURSE PRACTITIONER

## 2025-07-28 PROCEDURE — 1123F ACP DISCUSS/DSCN MKR DOCD: CPT | Performed by: NURSE PRACTITIONER

## 2025-07-28 PROCEDURE — 1159F MED LIST DOCD IN RCRD: CPT | Performed by: NURSE PRACTITIONER

## 2025-07-28 NOTE — PROGRESS NOTES
2025       HPI:     Name: Erinn Gan  YOB: 1945    CC: Patient is a 80 y.o. presenting for evaluation of UTI's, urinary urgency and frequency.       HPI: How long have you had this problem? She has been having burning at the vaginal opening after urination for about 2 months. She has not been using the estrogen cream because it causes significant burning.   Please rate the severity of your problem: moderate  Anything make it better?     25: negative (skin yifan)  She was given treatment at ER and did feel very sick which caused her to seek care.  Treatment alleviated her symptoms  25: negative (skin/urogenital yifan)  25: E coli: cipro, levofloxacin  25: E coli: I-tetracycline, levofloxacin, cipro     Ob/Gyn History:    OB History    Para Term  AB Living   3 2 2  1    SAB IAB Ectopic Molar Multiple Live Births   1           # Outcome Date GA Lbr Tye/2nd Weight Sex Type Anes PTL Lv   3 SAB         DEC   2 Term            1 Term    3.912 kg (8 lb 10 oz)          Past Medical History:   Past Medical History:   Diagnosis Date    Diastolic dysfunction     Fibromyalgia     Hypertension     Osteoarthritis      Past Surgical History:   Past Surgical History:   Procedure Laterality Date    BLADDER SUSPENSION      BREAST BIOPSY Left 2024    benign core biopsy    BREAST CYST EXCISION Bilateral     multiple bx, all benign, fibrocystic disease    BREAST SURGERY      CERVICAL SPINE SURGERY      fusion     CHOLECYSTECTOMY      COLON SURGERY  1980    resection    HYSTERECTOMY (CERVIX STATUS UNKNOWN)      JOINT REPLACEMENT      Knee 2013 right; knee  left knee    UPPER GASTROINTESTINAL ENDOSCOPY N/A 2019    EGD BIOPSY performed by Cruz DELGADILLO MD at [facility] ENDOSCOPY    US ASP BREAST CYST LEFT Left 2023    US ASP BREAST CYST LEFT 2023 Kenzie Frausto MD [facility] MOB ULTRASOUND     Allergies:   Allergies   Allergen Reactions    Alendronate

## 2025-08-13 ENCOUNTER — OFFICE VISIT (OUTPATIENT)
Dept: UROGYNECOLOGY | Age: 80
End: 2025-08-13
Payer: MEDICARE

## 2025-08-13 VITALS — TEMPERATURE: 97.4 F | HEART RATE: 83 BPM | RESPIRATION RATE: 18 BRPM | OXYGEN SATURATION: 93 %

## 2025-08-13 DIAGNOSIS — N81.6 RECTOCELE: ICD-10-CM

## 2025-08-13 DIAGNOSIS — N81.12 CYSTOCELE, LATERAL: Primary | ICD-10-CM

## 2025-08-13 PROCEDURE — 1159F MED LIST DOCD IN RCRD: CPT | Performed by: NURSE PRACTITIONER

## 2025-08-13 PROCEDURE — 1160F RVW MEDS BY RX/DR IN RCRD: CPT | Performed by: NURSE PRACTITIONER

## 2025-08-13 PROCEDURE — 99212 OFFICE O/P EST SF 10 MIN: CPT | Performed by: NURSE PRACTITIONER

## 2025-08-13 PROCEDURE — 1123F ACP DISCUSS/DSCN MKR DOCD: CPT | Performed by: NURSE PRACTITIONER

## (undated) DEVICE — FORCEPS BX L240CM DIA2.4MM L NDL RAD JAW 4 133340